# Patient Record
Sex: MALE | Race: WHITE | Employment: OTHER | URBAN - METROPOLITAN AREA
[De-identification: names, ages, dates, MRNs, and addresses within clinical notes are randomized per-mention and may not be internally consistent; named-entity substitution may affect disease eponyms.]

---

## 2019-11-11 ENCOUNTER — HOSPITAL ENCOUNTER (OUTPATIENT)
Dept: PHYSICAL THERAPY | Age: 69
Setting detail: THERAPIES SERIES
Discharge: HOME | End: 2019-11-11
Payer: MEDICARE

## 2019-11-11 PROCEDURE — 97110 THERAPEUTIC EXERCISES: CPT

## 2019-11-11 PROCEDURE — 97161 PT EVAL LOW COMPLEX 20 MIN: CPT

## 2019-11-11 ASSESSMENT — PAIN DESCRIPTION - LOCATION: LOCATION: LEG

## 2019-11-11 ASSESSMENT — PAIN DESCRIPTION - FREQUENCY: FREQUENCY: INTERMITTENT

## 2019-11-11 ASSESSMENT — PAIN DESCRIPTION - PROGRESSION: CLINICAL_PROGRESSION: GRADUALLY WORSENING

## 2019-11-11 ASSESSMENT — PAIN - FUNCTIONAL ASSESSMENT: PAIN_FUNCTIONAL_ASSESSMENT: PREVENTS OR INTERFERES WITH MANY ACTIVE NOT PASSIVE ACTIVITIES

## 2019-11-11 ASSESSMENT — PAIN SCALES - GENERAL: PAINLEVEL_OUTOF10: 2

## 2019-11-11 ASSESSMENT — PAIN DESCRIPTION - DESCRIPTORS: DESCRIPTORS: SHOOTING

## 2019-11-11 ASSESSMENT — PAIN DESCRIPTION - PAIN TYPE: TYPE: ACUTE PAIN

## 2019-11-11 ASSESSMENT — PAIN DESCRIPTION - ORIENTATION: ORIENTATION: LEFT

## 2019-11-19 ENCOUNTER — HOSPITAL ENCOUNTER (OUTPATIENT)
Dept: PHYSICAL THERAPY | Age: 69
Setting detail: THERAPIES SERIES
Discharge: HOME | End: 2019-11-19
Payer: MEDICARE

## 2019-11-19 PROCEDURE — 97140 MANUAL THERAPY 1/> REGIONS: CPT

## 2019-11-19 PROCEDURE — 97110 THERAPEUTIC EXERCISES: CPT

## 2019-11-25 ENCOUNTER — HOSPITAL ENCOUNTER (OUTPATIENT)
Dept: PHYSICAL THERAPY | Age: 69
Setting detail: THERAPIES SERIES
Discharge: HOME | End: 2019-11-25
Payer: MEDICARE

## 2019-11-25 PROCEDURE — 97110 THERAPEUTIC EXERCISES: CPT

## 2019-11-25 PROCEDURE — 97535 SELF CARE MNGMENT TRAINING: CPT

## 2019-12-03 ENCOUNTER — HOSPITAL ENCOUNTER (OUTPATIENT)
Dept: PHYSICAL THERAPY | Age: 69
Setting detail: THERAPIES SERIES
Discharge: HOME | End: 2019-12-03
Payer: MEDICARE

## 2019-12-03 PROCEDURE — 97110 THERAPEUTIC EXERCISES: CPT

## 2019-12-03 PROCEDURE — 97140 MANUAL THERAPY 1/> REGIONS: CPT

## 2019-12-10 ENCOUNTER — HOSPITAL ENCOUNTER (OUTPATIENT)
Dept: PHYSICAL THERAPY | Age: 69
Setting detail: THERAPIES SERIES
Discharge: HOME | End: 2019-12-10
Payer: MEDICARE

## 2019-12-10 PROCEDURE — 97140 MANUAL THERAPY 1/> REGIONS: CPT

## 2019-12-10 PROCEDURE — 97110 THERAPEUTIC EXERCISES: CPT

## 2019-12-23 ENCOUNTER — HOSPITAL ENCOUNTER (OUTPATIENT)
Dept: PHYSICAL THERAPY | Age: 69
Setting detail: THERAPIES SERIES
Discharge: HOME | End: 2019-12-23
Payer: MEDICARE

## 2019-12-23 PROCEDURE — 97535 SELF CARE MNGMENT TRAINING: CPT

## 2019-12-23 PROCEDURE — 97110 THERAPEUTIC EXERCISES: CPT

## 2020-01-03 ENCOUNTER — HOSPITAL ENCOUNTER (OUTPATIENT)
Dept: PHYSICAL THERAPY | Age: 70
Setting detail: THERAPIES SERIES
Discharge: HOME | End: 2020-01-03
Payer: MEDICARE

## 2020-01-03 PROCEDURE — 97140 MANUAL THERAPY 1/> REGIONS: CPT

## 2020-01-03 PROCEDURE — 97530 THERAPEUTIC ACTIVITIES: CPT

## 2020-01-03 PROCEDURE — 97110 THERAPEUTIC EXERCISES: CPT

## 2020-01-03 NOTE — FLOWSHEET NOTE
Outpatient Physical Therapy  [place]           [x] Phone: [phone]   Fax: [phone]  [facility]           [] Phone: [phone]   Fax: [phone]        Physical Therapy Daily Treatment Note  Date:  1/3/2020    Patient Name:  Arpit Jackson    :  1950  MRN: [id number]  Restrictions/Precautions:  none   Diagnosis:   Diagnosis: Left hip pain  Date of Injury/Surgery:   Treatment Diagnosis: Treatment Diagnosis: L posterior thigh pain    Insurance/Certification information: PT Insurance Information: medicare   Referring Physician:  Referring Practitioner: Stone Pino   Next Doctor Visit:    Plan of care signed (Y/N):  yes  Outcome Measure: HOOS 11  Visit# / total visits:    Pain level: 3/10       Goals:       Short term goals  Time Frame for Short term goals: 2wks  Short term goal 1: independence with HEP  Short term goal 2: improved tolerance to therapeutic exercise  Long term goals  Time Frame for Long term goals : 4wks  Long term goal 1: decrease pain 2pts on 10pt scale to meet minimal clinically important difference for pain  Long term goal 2: improve HOOS-PS 10pts to meet minimal clinically important difference for this outcome measure. Long term goal 3: improve L hamstring strength to 4+/5 to allow for decreased pain with ambulation    Summary of Evaluation: Assessment: Pt demonstrates decreased hamstring strength, tender points mid substance biceps femoris mm, and decreased hamstring flexibility. Pt would benefit from skilled PT care to decrease pain and increase tolerance to weight bearing and wellness activities including . 75mi daily walking routine. Pt demonstrates good understanding of condition and willingness to comply with HEP. Subjective:  Pt stated that his current pain was about 3/10 today. Pt stated that the worst pain was 7/10 since his last visit. Pt stated that he is not able to stand upright due to the pain.   Pt stated that he doesn't drink much water that he appropriate GT tool/tools and which stroke technique utilized were determined based on the assessment and treatment goals. The patients skin at the end of the treatment showed mild redness and improved flexiblity  Overall GT treatment time 20'  PROM knee flexion, PROM hip in all directions, and supine hamstring stretching and piriformis stretching x 10'       Modalities:  None       Communication with other providers:  pending      Assessment:  Pt tolerated treatment fairly well. Pt responded well to trial of Graston. Pt was able to stand more upright with no report of increased pain. Pt was able to get full knee extension with standing hamstring stretch on TM today. Pt had less mm spasms with prone knee flexion. End session pain: 1-2/10      Plan for Next Session: Specific instructions for Next Treatment: progress ROM and strength as able.        Time In / Time Out: 1000/1045       Timed Code/Total Treatment Minutes:     45'/ 39'     1 man ( 20') 1 TE ( 15') 1 TA ( 10')      Next Progress Note due:  Pending       Plan of Care Interventions:  [x] Therapeutic Exercise  [x] Modalities:  [x] Therapeutic Activity     [] Ultrasound  [x] Estim  [x] Gait Training      [] Cervical Traction [] Lumbar Traction  [x] Neuromuscular Re-education    [x] Cold/hotpack [] Iontophoresis   [x] Instruction in HEP      [x] Vasopneumatic   [] Dry Needling    [x] Manual Therapy               [] Aquatic Therapy              Electronically signed by: Martha Suero PTA       1/3/2020, 9:48 AM     1/3/2020,11:22 AM

## 2020-01-10 ENCOUNTER — HOSPITAL ENCOUNTER (OUTPATIENT)
Dept: PHYSICAL THERAPY | Age: 70
Setting detail: THERAPIES SERIES
Discharge: HOME | End: 2020-01-10
Payer: MEDICARE

## 2020-01-10 PROCEDURE — 97140 MANUAL THERAPY 1/> REGIONS: CPT

## 2020-01-10 PROCEDURE — 97530 THERAPEUTIC ACTIVITIES: CPT

## 2020-01-10 PROCEDURE — 97112 NEUROMUSCULAR REEDUCATION: CPT

## 2020-01-10 PROCEDURE — 97110 THERAPEUTIC EXERCISES: CPT

## 2020-01-10 NOTE — FLOWSHEET NOTE
Outpatient Physical Therapy  [place]           [x] Phone: [phone]   Fax: [phone]  [facility]           [] Phone: [phone]   Fax: [phone]        Physical Therapy Daily Treatment Note  Date:  1/10/2020    Patient Name:  Elana Ley    :  1950  MRN: [id number]  Restrictions/Precautions:  none   Diagnosis:   Diagnosis: Left hip pain  Date of Injury/Surgery:   Treatment Diagnosis: Treatment Diagnosis: L posterior thigh pain    Insurance/Certification information: PT Insurance Information: medicare   Referring Physician:  Referring Practitioner: Ana Ricci   Next Doctor Visit:    Plan of care signed (Y/N):  yes  Outcome Measure: HOOS 11  Visit# / total visits:    Pain level: 3/10       Goals:       Short term goals  Time Frame for Short term goals: 2wks  Short term goal 1: independence with HEP  Short term goal 2: improved tolerance to therapeutic exercise  Long term goals  Time Frame for Long term goals : 4wks  Long term goal 1: decrease pain 2pts on 10pt scale to meet minimal clinically important difference for pain  Long term goal 2: improve HOOS-PS 10pts to meet minimal clinically important difference for this outcome measure. Long term goal 3: improve L hamstring strength to 4+/5 to allow for decreased pain with ambulation    Summary of Evaluation: Assessment: Pt demonstrates decreased hamstring strength, tender points mid substance biceps femoris mm, and decreased hamstring flexibility. Pt would benefit from skilled PT care to decrease pain and increase tolerance to weight bearing and wellness activities including . 75mi daily walking routine. Pt demonstrates good understanding of condition and willingness to comply with HEP. Subjective:  Pt stated that his current pain was about 3/10 today. Pt stated that he saw his doctor and that she mentioned trying accupuncture ( 3 needles in his ear) for pain management.  Pt stated that she told him that it could be a long applied to the  L hamstrings because the assessment demonstrated tightness, muscle spasms, and pain. .The appropriate GT tool/tools and which stroke technique utilized were determined based on the assessment and treatment goals. The patients skin at the end of the treatment showed mild redness and improved flexiblity  Overall GT treatment time 15'  PROM knee flexion, PROM hip in all directions, and supine hamstring stretching and piriformis stretching x 10'       Modalities:  None       Communication with other providers:  pending      Assessment:  Pt tolerated treatment fairly well. Pt did well with manual treatment and stated that muscles felt more relaxed. Needed a lot cueing to get gluteal muscles to fire. Pt tends to overuse hamstrings. End session pain: 1-2/10      Plan for Next Session: Specific instructions for Next Treatment: progress ROM and strength as able.        Time In / Time Out: 1030/1125      Timed Code/Total Treatment Minutes:     55'/ 54'     1 man ( 25') 1 TE ( 10') 1 TA ( 10') 1 neuro (10')       Next Progress Note due:  Pending       Plan of Care Interventions:  [x] Therapeutic Exercise  [x] Modalities:  [x] Therapeutic Activity     [] Ultrasound  [x] Estim  [x] Gait Training      [] Cervical Traction [] Lumbar Traction  [x] Neuromuscular Re-education    [x] Cold/hotpack [] Iontophoresis   [x] Instruction in HEP      [x] Vasopneumatic   [] Dry Needling    [x] Manual Therapy               [] Aquatic Therapy              Electronically signed by: Kelly Blum PTA          1/10/2020,3:57 PM

## 2020-01-17 ENCOUNTER — HOSPITAL ENCOUNTER (OUTPATIENT)
Dept: PHYSICAL THERAPY | Age: 70
Setting detail: THERAPIES SERIES
Discharge: HOME | End: 2020-01-17
Payer: MEDICARE

## 2020-01-17 PROCEDURE — 97530 THERAPEUTIC ACTIVITIES: CPT

## 2020-01-17 PROCEDURE — 97110 THERAPEUTIC EXERCISES: CPT

## 2020-01-17 PROCEDURE — 97112 NEUROMUSCULAR REEDUCATION: CPT

## 2020-01-17 PROCEDURE — 97140 MANUAL THERAPY 1/> REGIONS: CPT

## 2020-01-17 NOTE — FLOWSHEET NOTE
Outpatient Physical Therapy  [place]           [x] Phone: [phone]   Fax: [phone]  [facility]           [] Phone: [phone]   Fax: [phone]        Physical Therapy Daily Treatment Note  Date:  2020    Patient Name:  Romayne Milling    :  1950  MRN: [id number]  Restrictions/Precautions:  none   Diagnosis:   Diagnosis: Left hip pain  Date of Injury/Surgery:   Treatment Diagnosis: Treatment Diagnosis: L posterior thigh pain    Insurance/Certification information: PT Insurance Information: medicare   Referring Physician:  Referring Practitioner: Jany Chavez   Next Doctor Visit:    Plan of care signed (Y/N):  yes  Outcome Measure: HOOS 11  Visit# / total visits:    Pain level: 3/10       Goals:       Short term goals  Time Frame for Short term goals: 2wks  Short term goal 1: independence with HEP  Short term goal 2: improved tolerance to therapeutic exercise  Long term goals  Time Frame for Long term goals : 4wks  Long term goal 1: decrease pain 2pts on 10pt scale to meet minimal clinically important difference for pain  Long term goal 2: improve HOOS-PS 10pts to meet minimal clinically important difference for this outcome measure. Long term goal 3: improve L hamstring strength to 4+/5 to allow for decreased pain with ambulation    Summary of Evaluation: Assessment: Pt demonstrates decreased hamstring strength, tender points mid substance biceps femoris mm, and decreased hamstring flexibility. Pt would benefit from skilled PT care to decrease pain and increase tolerance to weight bearing and wellness activities including . 75mi daily walking routine. Pt demonstrates good understanding of condition and willingness to comply with HEP. Subjective:  Pt stated that his current pain was about 3/10 today. Pt stated that he thinks we may be on the right track with focusing on gluteal strengthening. Any changes in Ambulatory Summary Sheet? None        Objective:     The patient received  explanation for the benefits and rational in utilizing the Graston Technique for their soft tissues limitations. Patient does not have any Red flags or absolute contraindications. Patient was instructed the use of the Graston Instruments on the skin may cause some discomfort/pain [place] of the skin, bruising or Petechiae. Patient understands these risks and has agreed to continue with the intervention. Needed a lot of cueing to perform TRX squats and needed max cues to keep knees behind his toes. Initially, patient could not actively perform prone hip extension with knee flexion on L and had to do it eccentrically, but after doing it on R, he could perform 10 actively. Exercises: (No more than 4 columns)   Exercise/Equipment date       1/17/2020   WARM UP    Nu-step  L-4 x 7'        TABLE    Prone glute set w/ frog legs 10x 2-3\" ea side  B 10x2 2-3\"    Prone ecc HS curl     Piriformis stretch with towel 2x 30\"   Hip extension w/ bent knee  Eccentric L x 10  L active 10x    R 10x2 active   Bridge with ball squeeze and feet on ball  10x2 GS        Supine hamstring stretch ---   STANDING    Hamstring stretch at treadmill    Wall slides     TRX  Squat                          PROPRIOCEPTION                        MODALITIES                 Other Therapeutic Activities/Education: Anatomy was reviewed with patient at his request.      Home Exercise Program:   practiced and pt demo ability to perform 12/23/19        Manual Treatments: GT was applied to the  L hamstrings because the assessment demonstrated tightness, muscle spasms, and pain. .The appropriate GT tool/tools and which stroke technique utilized were determined based on the assessment and treatment goals.    The patients skin at the end of the treatment showed mild redness and improved flexiblity  Overall GT treatment time 15'  PROM knee flexion, PROM hip in all directions, and supine hamstring stretching and piriformis stretching x 5' Modalities:  None       Communication with other providers:  pending      Assessment:  Pt tolerated treatment fairly well. Pt did well with manual treatment and stated that muscles felt more relaxed. Needed a lot cueing to get gluteal muscles to fire. Pt tends to overuse hamstrings. End session pain: 1-2/10      Plan for Next Session: Specific instructions for Next Treatment: progress ROM and strength as able.        Time In / Time Out: 1050/1145      Timed Code/Total Treatment Minutes:     55'/ 54'     1 man ( 20') 1 TE ( 10') 1 TA ( 10') 1 neuro (15')       Next Progress Note due:  Pending       Plan of Care Interventions:  [x] Therapeutic Exercise  [x] Modalities:  [x] Therapeutic Activity     [] Ultrasound  [x] Estim  [x] Gait Training      [] Cervical Traction [] Lumbar Traction  [x] Neuromuscular Re-education    [x] Cold/hotpack [] Iontophoresis   [x] Instruction in HEP      [x] Vasopneumatic   [] Dry Needling    [x] Manual Therapy               [] Aquatic Therapy              Electronically signed by: Victor Manuel Lawrence PTA        1/17/2020,4:44 PM

## 2020-01-24 ENCOUNTER — HOSPITAL ENCOUNTER (OUTPATIENT)
Dept: PHYSICAL THERAPY | Age: 70
Setting detail: THERAPIES SERIES
Discharge: HOME | End: 2020-01-24
Payer: MEDICARE

## 2020-01-24 PROCEDURE — 97110 THERAPEUTIC EXERCISES: CPT

## 2020-01-24 PROCEDURE — G0283 ELEC STIM OTHER THAN WOUND: HCPCS

## 2020-01-24 PROCEDURE — 97530 THERAPEUTIC ACTIVITIES: CPT

## 2020-01-24 PROCEDURE — 97112 NEUROMUSCULAR REEDUCATION: CPT

## 2020-01-24 NOTE — FLOWSHEET NOTE
Plan of Care Interventions:  [x] Therapeutic Exercise  [x] Modalities:  [x] Therapeutic Activity     [] Ultrasound  [x] Estim  [x] Gait Training      [] Cervical Traction [] Lumbar Traction  [x] Neuromuscular Re-education    [x] Cold/hotpack [] Iontophoresis   [x] Instruction in HEP      [x] Vasopneumatic   [] Dry Needling    [x] Manual Therapy               [] Aquatic Therapy              Electronically signed by: Sasha Julian PTA      1/24/2020,10:00 AM        1/24/2020,2:01 PM

## 2020-01-31 ENCOUNTER — HOSPITAL ENCOUNTER (OUTPATIENT)
Dept: PHYSICAL THERAPY | Age: 70
Setting detail: THERAPIES SERIES
Discharge: HOME | End: 2020-01-31
Payer: MEDICARE

## 2020-01-31 PROCEDURE — 97112 NEUROMUSCULAR REEDUCATION: CPT

## 2020-01-31 PROCEDURE — 97110 THERAPEUTIC EXERCISES: CPT

## 2020-01-31 NOTE — PROGRESS NOTES
and glute strength. Pt does appear to use hamstring muscles preferentially for most activities and demonstrates decreased quadriceps and gluteal tone relative to hamstrings. Approach has been to strengthen these muscles over past couple of visits through TE, but progress has been minimal. Will continue to strengthen but pt has been educated to follow up with doctor for other treatment options due to slow progress in therapy. Has been compliant with HEP throughout tx. Goal Status:  [] Achieved [x] Partially Achieved  [] Not Achieved     Changes to goals:        Short term goals  Time Frame for Short term goals: 2wks  Short term goal 1: independence with HEP met 1/31/20  Short term goal 2: improved tolerance to therapeutic exercise 1/31/20  Long term goals  Time Frame for Long term goals : 4wks  Long term goal 1: decrease pain 2pts on 10pt scale to meet minimal clinically important difference for pain  Long term goal 2: improve HOOS-PS 10pts to meet minimal clinically important difference for this outcome measure. Long term goal 3: improve L hamstring strength to 4+/5 to allow for decreased pain with ambulation         Frequency/Duration:  # Days per week: [] 1 day # Weeks: [] 1 week [] 4 weeks [] 8 weeks     [x] 2 days   [] 2 weeks [] 5 weeks [] 10 weeks     [] 3 days   [] 3 weeks [] 6 weeks [x] 12 weeks       Rehab Potential: [] Excellent [x] Good [] Fair  [] Poor         Patient Status: [] Continue per initial plan of Care     [] Patient now discharged     [x] Additional visits requested, Please re-certify for additional visits:      Requested frequency/duration:  2/week for 12weeks total throughout this plan of care. Pt has used 10/24 visits. Please confirm this is adequate for this pt. If we are requesting more visits, we fully anticipate the patient's condition is expected to improve within the treatment timeframe we are requesting.     Electronically signed by:         1/31/2020, 10:29 AM    If you have any questions or concerns, please don't hesitate to call.   Thank you for your referral.    Physician Signature:______________________ Date:______ Time: ________  By signing above, therapists plan is approved by physician

## 2020-01-31 NOTE — FLOWSHEET NOTE
Outpatient Physical Therapy  [place]           [x] Phone: [phone]   Fax: [phone]  [facility]           [] Phone: [phone]   Fax: [phone]        Physical Therapy Daily Treatment Note  Date:  2020    Patient Name:  Saúl Mitchell    :  1950  MRN: [id number]  Restrictions/Precautions:  none   Diagnosis:   Diagnosis: Left hip pain  Date of Injury/Surgery:   Treatment Diagnosis: Treatment Diagnosis: L posterior thigh pain    Insurance/Certification information: PT Insurance Information: medicare   Referring Physician:  Referring Practitioner: Serg Gonzalez   Next Doctor Visit:    Plan of care signed (Y/N):  yes  Outcome Measure: HOOS 11  Visit# / total visits:    Pain level: 310       Goals:       Short term goals  Time Frame for Short term goals: 2wks  Short term goal 1: independence with HEP  Short term goal 2: improved tolerance to therapeutic exercise  Long term goals  Time Frame for Long term goals : 4wks  Long term goal 1: decrease pain 2pts on 10pt scale to meet minimal clinically important difference for pain  Long term goal 2: improve HOOS-PS 10pts to meet minimal clinically important difference for this outcome measure. Long term goal 3: improve L hamstring strength to 4+/5 to allow for decreased pain with ambulation    Summary of Evaluation: Assessment: Pt demonstrates decreased hamstring strength, tender points mid substance biceps femoris mm, and decreased hamstring flexibility. Pt would benefit from skilled PT care to decrease pain and increase tolerance to weight bearing and wellness activities including . 75mi daily walking routine. Pt demonstrates good understanding of condition and willingness to comply with HEP. Subjective:  Pt reports difficulty sleeping, hard to turn in bed its aggrevated as I sleep. Pt reports standing endurance is limited, however endurance for exercise is getting better pt says    Any changes in Ambulatory Summary Sheet? None        Objective  R quad str 4+/5, L quad 4/5, R/L HS 4+/5         Exercises: (No more than 4 columns)   Exercise/Equipment date date       1/17/2020 1/24/2020 #9   WARM UP     Nu-step  L-4 x 7'  L-7 x 8 min         TABLE     Prone glute set w/ frog legs 10x 2-3\" ea side  B 10x2 2-3\"  --   Prone ecc HS curl   --   Piriformis stretch with towel 2x 30\" [id number]   Hip extension w/ bent knee  Eccentric L x 10  L active 10x    R 10x2 active --   Bridge with ball squeeze and feet on ball  10x2 GS --   SLR w/ ER  SLR   10x2 ea LE  10x2 ea LE    Supine hamstring stretch --- --   STANDING     Hamstring stretch at treadmill     Wall sits  10\" hold x 5   TRX  Squat      Ant step up into full extension   4\" 10x ea LE  6\" 10x2 ea LE  8\" 2x5 x ea LE   Shuttle kick back      Shuttle leg press   DL 3C 20x 2        Standing hip ext  2x5 reps   PROPRIOCEPTION                              MODALITIES                    Other Therapeutic Activities/Education: Explained to patient that he was overusing his hamstring muscles and under using his quads and gluteal muscles. Explained that power came from quads, abs, and gluteal muscles. Home Exercise Program:   practiced and pt demo ability to perform 12/23/19        Manual Treatments:    Modalities:  None       Communication with other providers:  pending      Assessment:  Pt tolerated treatment fairly well. Poor mechanics due to decreased general weakness in B quads and gluteals especially. Pt will continue to benefit from more therapy to increase ROM and strength to improve mechanics. Plan for Next Session: Specific instructions for Next Treatment: progress ROM and strength as able.        Time In / Time Out: 3833-5021     Timed Code/Total Treatment Minutes:     55/55      TE30, NE25,    Next Progress Note due:  Pending       Plan of Care Interventions:  [x] Therapeutic Exercise  [x] Modalities:  [x] Therapeutic Activity     [] Ultrasound  [x] Estim  [x] Gait Training      []

## 2020-02-07 ENCOUNTER — HOSPITAL ENCOUNTER (OUTPATIENT)
Dept: PHYSICAL THERAPY | Age: 70
Setting detail: THERAPIES SERIES
Discharge: HOME | End: 2020-02-07
Payer: MEDICARE

## 2020-02-07 PROCEDURE — 97110 THERAPEUTIC EXERCISES: CPT

## 2020-02-07 PROCEDURE — 97112 NEUROMUSCULAR REEDUCATION: CPT

## 2020-02-07 PROCEDURE — 97530 THERAPEUTIC ACTIVITIES: CPT

## 2020-02-07 NOTE — FLOWSHEET NOTE
soreness in B gluteals. Pt stated it was suggested that he should try a chiropractor. Pt stated that he has an appointment on Thursday. Pt stated that he has had some episodes of excessive bleeding. Pt stated he nicked himself on a piece of tin. Pt stated that he noticed more bleeding throughout the day. Pt stated that he is also dealing with impacted tooth woke up with a mouth full of blood. Pt stated that he stopped taking Tylenol. Any changes in Ambulatory Summary Sheet? None        Objective Pt had pretty good form with all of his exercises. Pt needed some cueing to avoid overusing hamstrings for some of his exercises. Pt was able to tolerate stretches for a longer duration today. Exercises: (No more than 4 columns)   Exercise/Equipment 2/7/2020         WARM UP    Nu-step  L-6  X 8'       TABLE    Prone ecc HS curl     Piriformis stretch with towel    Hip extension w/ bent knee     Bridge with ball squeeze and feet on ball     SLR w/ ER  SLR  10x2 ea LE w/ pillow under opposite    S/L hip abduction 10x2 ea LE    Supine hamstring stretch    STANDING    Hamstring stretch at treadmill 30\" x2  Ea LE   Sit to stand  10x w/ cues to keep knees behind toes   Wall sits 5 x 10\"    TRX  Squat     Ant step up into full extension  6\" 10x2 ea LE   Shuttle kick back     Shuttle leg press     Standing hip ext 10x2 ea LE   PROPRIOCEPTION                        MODALITIES                 Other Therapeutic Activities/Education: Explained to patient that he was overusing his hamstring muscles and under using his quads and gluteal muscles. Explained that power came from quads, abs, and gluteal muscles. Home Exercise Program:   practiced and pt demo ability to perform 2/7/2020        Manual Treatments: stick rolling to L/R hamstrings and along IT bands x 5'  Modalities:  None       Communication with other providers:  pending      Assessment:  Pt tolerated treatment fairly well. Pt is making strength and ROM gains.

## 2020-02-21 ENCOUNTER — HOSPITAL ENCOUNTER (OUTPATIENT)
Dept: PHYSICAL THERAPY | Age: 70
Setting detail: THERAPIES SERIES
Discharge: HOME | End: 2020-02-21
Payer: MEDICARE

## 2020-02-21 PROCEDURE — 97110 THERAPEUTIC EXERCISES: CPT

## 2020-02-21 NOTE — FLOWSHEET NOTE
Outpatient Physical Therapy  [place]           [x] Phone: [phone]   Fax: [phone]  [facility]           [] Phone: [phone]   Fax: [phone]        Physical Therapy Daily Treatment Note  Date:  2020    Patient Name:  Larry Napoles    :  1950  MRN: [id number]  Restrictions/Precautions:  none   Diagnosis:   Diagnosis: Left hip pain  Date of Injury/Surgery:   Treatment Diagnosis: Treatment Diagnosis: L posterior thigh pain    Insurance/Certification information: PT Insurance Information: medicare   Referring Physician:  Referring Practitioner: Carissa Dominguez   Next Doctor Visit:    Plan of care signed (Y/N):  yes  Outcome Measure: HOOS 3/20   Visit# / total visits:    Pain level: 1/10       Goals:       Short term goals  Time Frame for Short term goals: 2wks  Short term goal 1: independence with HEP  Short term goal 2: improved tolerance to therapeutic exercise  Long term goals  Time Frame for Long term goals : 4wks  Long term goal 1: decrease pain 2pts on 10pt scale to meet minimal clinically important difference for pain  Long term goal 2: improve HOOS-PS 10pts to meet minimal clinically important difference for this outcome measure. Long term goal 3: improve L hamstring strength to 4+/5 to allow for decreased pain with ambulation    Summary of Evaluation: Assessment: Pt demonstrates decreased hamstring strength, tender points mid substance biceps femoris mm, and decreased hamstring flexibility. Pt would benefit from skilled PT care to decrease pain and increase tolerance to weight bearing and wellness activities including . 75mi daily walking routine. Pt demonstrates good understanding of condition and willingness to comply with HEP. Subjective:  Pt states he has gone to the chiropractor and they did deep tissue massage as well as TENS and since that time he has felt some improvement. Also feels some improvement from the exercises given with HEP from PT.      Any some functional decrease in strength. MMT is fair for B LE, but demos significant fatigue with 2 x 10 SLR indicating quad weakness and decreased endurance. Pt does appear to have overactive hamstrings that contribute to the pain felt this therapy plan of care. Quads and glutes appear to mine underactive in functional tasks compared to hamstrings. Only modest change of these biomechanical errors have been made at this time, but pt feels he is okay to take 3 weeks off therapy to HEP and then re assess if needed with PT. Plan for Next Session: Specific instructions for Next Treatment: progress ROM and strength as able.        Time In / Time Out: 6954-2518    Timed Code/Total Treatment Minutes:  48min 3 TE    Next Progress Note due:  Pending       Plan of Care Interventions:  [x] Therapeutic Exercise  [x] Modalities:  [x] Therapeutic Activity     [] Ultrasound  [x] Estim  [x] Gait Training      [] Cervical Traction [] Lumbar Traction  [x] Neuromuscular Re-education    [x] Cold/hotpack [] Iontophoresis   [x] Instruction in HEP      [x] Vasopneumatic   [] Dry Needling    [x] Manual Therapy               [] Aquatic Therapy              Electronically signed by:         2/21/2020,9:58 AM

## 2020-03-13 NOTE — FLOWSHEET NOTE
MR LIAO CX LAST VISIT , ASK TO KEEP CHART OPEN FOR 30 DAY JUST IN CASE A PROBLEM SHOULD COME UP. THANKED EVERYONE FOR ALL THEIR HELP.

## 2020-04-07 ENCOUNTER — HOSPITAL ENCOUNTER (OUTPATIENT)
Dept: PHYSICAL THERAPY | Age: 70
Discharge: HOME | End: 2020-04-07

## 2020-04-07 NOTE — DISCHARGE SUMMARY
To: Referring Practitioner: Allegra uHffman                                                From: Debbie Rivers PT, MPT, ATC        Patient: Chelsea Duenas                                                        : 1950  Diagnosis: Diagnosis: Left hip pain     Treatment Diagnosis: Treatment Diagnosis: L posterior thigh pain   2020       Dear Rajani Crews,     Pt cancelled his discharge visit so we are unable to fully assess status or goals at this time. He was seen 13 visits, just 3 visits more than last Progress note, please refer to that for best status update. We will d/c PT at this time per his request when cancelled last 2 sessions. We can re-evaluate later prn.      Thank you,   Debbie Rivers PT, MPT, ATC     2020, 2:42 PM

## 2021-08-05 ENCOUNTER — HOSPITAL ENCOUNTER (OUTPATIENT)
Dept: PHYSICAL THERAPY | Age: 71
Setting detail: THERAPIES SERIES
Discharge: HOME | End: 2021-08-05
Payer: MEDICARE

## 2021-08-05 PROCEDURE — 97110 THERAPEUTIC EXERCISES: CPT

## 2021-08-05 PROCEDURE — 97161 PT EVAL LOW COMPLEX 20 MIN: CPT

## 2021-08-05 ASSESSMENT — PAIN DESCRIPTION - PROGRESSION: CLINICAL_PROGRESSION: GRADUALLY IMPROVING

## 2021-08-05 ASSESSMENT — PAIN DESCRIPTION - DESCRIPTORS: DESCRIPTORS: SHARP;ACHING;DULL

## 2021-08-05 ASSESSMENT — PAIN DESCRIPTION - PAIN TYPE: TYPE: CHRONIC PAIN

## 2021-08-05 ASSESSMENT — PAIN DESCRIPTION - ONSET: ONSET: GRADUAL

## 2021-08-05 ASSESSMENT — PAIN SCALES - GENERAL: PAINLEVEL_OUTOF10: 0

## 2021-08-05 ASSESSMENT — PAIN DESCRIPTION - ORIENTATION: ORIENTATION: RIGHT;ANTERIOR

## 2021-08-05 ASSESSMENT — PAIN DESCRIPTION - LOCATION: LOCATION: KNEE

## 2021-08-05 ASSESSMENT — PAIN DESCRIPTION - FREQUENCY: FREQUENCY: INTERMITTENT

## 2021-08-05 NOTE — PLAN OF CARE
Gait Training      [] Cervical Traction [] Lumbar Traction  [x] Neuromuscular Re-education    [x] Cold/hotpack [] Iontophoresis   [x] Instruction in HEP      [x] Vasopneumatic    [] Dry Needling  [x] Manual Therapy               [] Aquatic Therapy       Other:          Frequency/Duration:  # Days per week: [] 1 day # Weeks: [] 1 week [] 5 weeks     [x] 2 days   [] 2 weeks [] 6 weeks     [] 3 days   [] 3 weeks [] 7 weeks     [] 4 days   [x] 4 weeks [] 8 weeks         [] 9 weeks [] 10 weeks         [] 11 weeks [] 12 weeks    Rehab Potential/Progress: [] Excellent [x] Good [] Fair  [] Poor     Goals:    Patient goals : improve pain and mobility. Short term goals  Time Frame for Short term goals: Defer to Long Term Goals    Short term goals  Time Frame for Short term goals: Defer to [address] Term Goals: 4 weeks 9/5/21  Long Term Goal 1: Pt will demo I with HEP/symptom management. Long Term Goal 2: Pt will demo normal gait mechanics with min/no deficits to ease community mobility. Long Term Goal 3: Pt will demo 0-120 deg knee A/PROM to ease transfers. Long Term Goal 4: Pt will completed descending stairs with very min to no increase in knee pain. Long Term Goal 5: Pt will demo <8/28 improvement per KOOS to demo improved functional mobility. Long Term Goal 6: Pt will demo >5# improvement per Microfit for quads/hamstrings to ease prolonged activities. Patient Goals   Patient goals : improve pain and mobility. Electronically signed by:  Callie Benitez PT, DPT, OCS  8/5/2021, 4:14 PM    8/5/2021 4:14 PM         If you have any questions or concerns, please don't hesitate to call.   Thank you for your referral.      Physician Signature:________________________________Date:_________ TIME: _____  By signing above, therapists plan is approved by physician

## 2021-08-05 NOTE — FLOWSHEET NOTE
Outpatient Physical Therapy  [place]           [x] Phone: [phone]   Fax: [phone]  [place]           [] Phone: [phone]   Fax: [phone]        Physical Therapy Daily Treatment Note  Date:  2021    Patient Name:  Aurelia Bingham    :  1950  MRN: [id number]  Restrictions/Precautions:    Diagnosis:   R knee OA   Date of Injury/Surgery:   Treatment Diagnosis:    R knee pain, weakness, stiffness   Insurance/Certification information:   [employer]   Referring Physician: Frances PEREZ          Next Doctor Visit:    Plan of care signed (Y/N): N, sent 21   Outcome Measure: KOOS:  disability  Visit# / total visits:    per POC  Pain level: 0/10   Goals:         Short term goals  Time Frame for Short term goals: Defer to [address] Term Goals: 4 weeks 21  Long Term Goal 1: Pt will demo I with HEP/symptom management. Long Term Goal 2: Pt will demo normal gait mechanics with min/no deficits to ease community mobility. Long Term Goal 3: Pt will demo 0-120 deg knee A/PROM to ease transfers. Long Term Goal 4: Pt will completed descending stairs with very min to no increase in knee pain. Long Term Goal 5: Pt will demo < improvement per KOOS to demo improved functional mobility. Long Term Goal 6: Pt will demo >5# improvement per [employer] for quads/hamstrings to ease prolonged activities. Patient Goals   Patient goals : improve pain and mobility. Summary of Evaluation:    Pt is 66year old male with 2 month gradual onset of R knee pain. Pt now has difficulties completing closed chain activities including stairs and sit to stand and prolonged weightbearing activities with increasing pain. Pt demo deficits this date that include pain, flexibility restrictions and weakness. Issued handout for exercises to address deficits to improve function.  Pt will benefit with PT services with strength/ROM, gait training, manual and modalities to maximize

## 2021-08-05 NOTE — PROGRESS NOTES
Physical Therapy  Initial Assessment  Date: 2021  Patient Name: Mami Alcaraz  MRN: [id number]  : 1950     Treatment Diagnosis: R knee pain, weakness, stiffness    Restrictions       Subjective   General  Chart Reviewed: Yes  Patient assessed for rehabilitation services?: Yes  Referring Practitioner: Valeriano PEREZ  PT Visit Information  PT Insurance Information: [employer]/  Subjective  Subjective: 2 months ago with gradual increase in R knee pain. Completing HEP from previous management with L knee for R knee. Eventual seen of doc with no imaging with decision to send to PT. Places gel onto knees with min improvement. Return after PT is completed. Pain Screening  Patient Currently in Pain: Yes  Pain Assessment  Pain Assessment: 0-10  Pain Level: 0 (Worst: 6/10)  Patient's Stated Pain Goal: No pain  Pain Type: Chronic pain  Pain Location: Knee  Pain Orientation: Right; Anterior  Pain Descriptors: Shayne Her; Aching;Dull  Pain Frequency: Intermittent  Pain Onset: Gradual  Clinical Progression: Gradually improving  Vital Signs  Patient Currently in Pain: Yes    Vision/Hearing  Vision  Vision: Within Functional Limits  Hearing  Hearing: Within functional limits    Orientation  Orientation  Overall Orientation Status: Within Normal Limits    Social/Functional History  Social/Functional History  Type of Home: House  Home Layout: One level; Work area in basement  ADL Assistance: Independent  Homemaking Assistance: Independent  Ambulation Assistance: Independent  Transfer Assistance: Independent  Active : Yes  Mode of Transportation: Car  Occupation: Retired  Leisure & Hobbies: work in the shop    Objective     Observation/Palpation  Palpation: medial/joint line tenderness. Observation: able to stand without UE assistance. 1/2 squat with B distal thigh pain. Lack full TKE with standing on R knee and with gait with initial contact.   Edema: None observed    AROM RLE (degrees)  RLE General AROM: -4 deg to 124 deg knee ext/flex with pain with terminal knee ext  AROM LLE (degrees)  LLE General AROM: 0-140 deg knee ext/flex  Joint Mobility  ROM RLE: normal patellar mobility without crepitus. Strength RLE  Strength RLE: Exception  Comment: No pain with testing with weakness compared to non-dominant L LE  R Knee Flexion: 4+/5  R Knee Extension: 4+/5  Strength LLE  Strength LLE: WNL  Comment: 5/5 in all directions. Strength Other  Other: 5x sit to stand: 16.42 sec with 4.10 pain                   Microfit   L/R    Quads:  38/29#                     Hamstrings: /#            6MWT: 1379ft without rest break with no increase in pain. Additional Measures  Other: KOOS:  disability     Balance  Single Leg Stance R Le  Single Leg Stance L Le  Comments: No increase in pain with testing. Assessment   Conditions Requiring Skilled Therapeutic Intervention  Body structures, Functions, Activity limitations: Decreased ROM; Decreased functional mobility ; Decreased strength;Decreased endurance; Increased pain  Pt is 66year old male with 2 month gradual onset of R knee pain. Pt now has difficulties completing closed chain activities including stairs and sit to stand and prolonged weightbearing activities with increasing pain. Pt demo deficits this date that include pain, flexibility restrictions and weakness. Issued handout for exercises to address deficits to improve function. Pt will benefit with PT services with strength/ROM, gait training, manual and modalities to maximize function. Pt prior to onset of current condition had min/no pain with able to complete full ADLs and work activities. Patient received education on their current pathology and how their condition effects them with their functional activities. Patient understood discussion and questions were answered. Patient understands their activity limitations and understands rational for treatment progression.   Treatment Diagnosis: R knee pain, weakness, stiffness  Prognosis: Good  Decision Making: Low Complexity  Barriers to Learning: None  REQUIRES PT FOLLOW UP: Yes  Activity Tolerance  Activity Tolerance: Patient Tolerated treatment well         Plan   Plan  Times per week: 2  Plan weeks: 4  Specific instructions for Next Treatment: review HEP, closed chain targeting R quad, quad streching, ROM to end range ext/flex with light OP, modalities PRN  Current Treatment Recommendations: Strengthening, ROM, Neuromuscular Re-education, Manual Therapy - Soft Tissue Mobilization, Home Exercise Program, Modalities, Gait Training, Balance Training       Goals  Short term goals  Time Frame for Short term goals: Defer to Long Term Goals  Long Term Goals: 4 weeks 9/5/21  Long Term Goal 1: Pt will demo I with HEP/symptom management. Long Term Goal 2: Pt will demo normal gait mechanics with min/no deficits to ease community mobility. Long Term Goal 3: Pt will demo 0-120 deg knee A/PROM to ease transfers. Long Term Goal 4: Pt will completed descending stairs with very min to no increase in knee pain. Long Term Goal 5: Pt will demo <8/28 improvement per KOOS to demo improved functional mobility. Long Term Goal 6: Pt will demo >5# improvement per [employer] for quads/hamstrings to ease prolonged activities. Patient Goals   Patient goals : improve pain and mobility.          Roberto Petersen, PT, DPT, OCS    8/5/2021 4:13 PM

## 2021-08-09 ENCOUNTER — HOSPITAL ENCOUNTER (OUTPATIENT)
Dept: PHYSICAL THERAPY | Age: 71
Setting detail: THERAPIES SERIES
Discharge: HOME | End: 2021-08-09
Payer: MEDICARE

## 2021-08-09 PROCEDURE — 97110 THERAPEUTIC EXERCISES: CPT

## 2021-08-09 NOTE — FLOWSHEET NOTE
Outpatient Physical Therapy  [place]           [x] Phone: [phone]   Fax: [phone]  [facility]           [] Phone: [phone]   Fax: [phone]        Physical Therapy Daily Treatment Note  Date:  2021    Patient Name:  Miguelito Oseguera    :  1950  MRN: [id number]  Restrictions/Precautions:    Diagnosis:   R knee OA   Date of Injury/Surgery:   Treatment Diagnosis:    R knee pain, weakness, stiffness   Insurance/Certification information:   [employer]   Referring Physician: Dimas PEREZ          Next Doctor Visit:    Plan of care signed (Y/N): N, sent 21   Outcome Measure: KOOS:  disability  Visit# / total visits:    per POC  Pain level: 0/10   Goals:         Short term goals  Time Frame for Short term goals: Defer to [address] Term Goals: 4 weeks 21  Long Term Goal 1: Pt will demo I with HEP/symptom management. Long Term Goal 2: Pt will demo normal gait mechanics with min/no deficits to ease community mobility. Long Term Goal 3: Pt will demo 0-120 deg knee A/PROM to ease transfers. Long Term Goal 4: Pt will completed descending stairs with very min to no increase in knee pain. Long Term Goal 5: Pt will demo <8/28 improvement per KOOS to demo improved functional mobility. Long Term Goal 6: Pt will demo >5# improvement per [employer] for quads/hamstrings to ease prolonged activities. Patient Goals   Patient goals : improve pain and mobility. Summary of Evaluation:    Pt is 66year old male with 2 month gradual onset of R knee pain. Pt now has difficulties completing closed chain activities including stairs and sit to stand and prolonged weightbearing activities with increasing pain. Pt demo deficits this date that include pain, flexibility restrictions and weakness. Issued handout for exercises to address deficits to improve function.  Pt will benefit with PT services with strength/ROM, gait training, manual and modalities to maximize function. Pt prior to onset of current condition had min/no pain with able to complete full ADLs and work activities. Patient received education on their current pathology and how their condition effects them with their functional activities. Patient understood discussion and questions were answered. Patient understands their activity limitations and understands rational for treatment progression. Subjective:  Pt arrives to tx session reporting 0/10. Any changes in Ambulatory Summary Sheet? None        Objective:  See eval   COVID screening questions were asked and patient attested that there had been no contact or symptoms        Exercises: (No more than 4 columns)   Exercise/Equipment 8/5/21  #1 8/9/21 #2 Date           WARM UP        Nu step    L4  6'          TABLE      *resisted SLR RTB 10x2 RTB 10x2    * knee ext stretch in sitting  10x2  3\" 10x2  3\"    * prone quad stretch  30\"x4 30\"x4     heelslides with SB  2x10 GSB    Heel prop with weight   1'    Stool drags       Supine hip flexor stretch                STANDING      *Wall sit with R foot behind with assist to stand  10x  5\" 10x  5\"    Shuttle leg press   R LE 3c                                       PROPRIOCEPTION                                    MODALITIES                      Other Therapeutic Activities/Education: Patient received education on their current pathology and how their condition effects them with their functional activities. Patient understood discussion and questions were answered. Patient understands their activity limitations and understands rational for treatment progression. Home Exercise Program:  HO issued, reviewed and discussed with patient. Pt agreed to comply.         Manual Treatments: --       Modalities: --       Communication with other providers:  POC sent 8/5/21     Assessment:  (Response towards treatment session) (Pain Rating)       Pt is 66year old male with 2 month gradual onset of R knee pain. Pt now has difficulties completing closed chain activities including stairs and sit to stand and prolonged weightbearing activities with increasing pain. Pt demo deficits this date that include pain, flexibility restrictions and weakness. Issued handout for exercises to address deficits to improve function. Pt will benefit with PT services with strength/ROM, gait training, manual and modalities to maximize function. Pt prior to onset of current condition had min/no pain with able to complete full ADLs and work activities. Patient received education on their current pathology and how their condition effects them with their functional activities. Patient understood discussion and questions were answered. Patient understands their activity limitations and understands rational for treatment progression.       Plan for Next Session:  review HEP, closed chain targeting R quad, quad streching, ROM to end range ext/flex with light OP, modalities PRN      Time In / Time Out:   1515 / 1555       If BWC Please Indicate Time In/Out/Total Time  CPT Code Time in Time out Total Time                                                            Total for session             Timed Code/Total Treatment Minutes:  36' / 36'    3 TE      Next Progress Note due:  Eval 8/5/21      30 days       Plan of Care Interventions:  [x] Therapeutic Exercise  [x] Modalities:  [x] Therapeutic Activity     [] Ultrasound  [x] Estim  [x] Gait Training      [] Cervical Traction [] Lumbar Traction  [x] Neuromuscular Re-education    [x] Cold/hotpack [] Iontophoresis   [x] Instruction in HEP      [x] Vasopneumatic   [] Dry Needling    [x] Manual Therapy               [] Aquatic Therapy              Electronically signed by:  Tabitha Parrish PTA   8/9/21   3:15 pm

## 2021-08-13 ENCOUNTER — HOSPITAL ENCOUNTER (OUTPATIENT)
Dept: PHYSICAL THERAPY | Age: 71
Setting detail: THERAPIES SERIES
Discharge: HOME | End: 2021-08-13
Payer: MEDICARE

## 2021-08-13 PROCEDURE — 97530 THERAPEUTIC ACTIVITIES: CPT

## 2021-08-13 PROCEDURE — 97110 THERAPEUTIC EXERCISES: CPT

## 2021-08-13 NOTE — FLOWSHEET NOTE
Outpatient Physical Therapy  [place]           [x] Phone: [phone]   Fax: [phone]  [place]           [] Phone: [phone]   Fax: [phone]        Physical Therapy Daily Treatment Note  Date:  2021    Patient Name:  Lynette Webster    :  1950  MRN: [id number]  Restrictions/Precautions:    Diagnosis:   R knee OA   Date of Injury/Surgery:   Treatment Diagnosis:    R knee pain, weakness, stiffness   Insurance/Certification information:   [employer]   Referring Physician: Yessica PEREZ          Next Doctor Visit:    Plan of care signed (Y/N): N, sent 21   Outcome Measure: KOOS:  disability  Visit# / total visits:   3/8 per POC  Pain level:  0/10   Goals:         Short term goals  Time Frame for Short term goals: Defer to [address] [facility] Term Goals: 4 weeks 21  Long Term Goal 1: Pt will demo I with HEP/symptom management. Long Term Goal 2: Pt will demo normal gait mechanics with min/no deficits to ease community mobility. Mostly MET   Long Term Goal 3: Pt will demo 0-120 deg knee A/PROM to ease transfers. Long Term Goal 4: Pt will completed descending stairs with very min to no increase in knee pain. Long Term Goal 5: Pt will demo <8/28 improvement per KOOS to demo improved functional mobility. Long Term Goal 6: Pt will demo >5# improvement per Microfit for quads/hamstrings to ease prolonged activities. Patient Goals   Patient goals : improve pain and mobility. Summary of Evaluation:    Pt is 66year old male with 2 month gradual onset of R knee pain. Pt now has difficulties completing closed chain activities including stairs and sit to stand and prolonged weightbearing activities with increasing pain. Pt demo deficits this date that include pain, flexibility restrictions and weakness. Issued handout for exercises to address deficits to improve function.  Pt will benefit with PT services with strength/ROM, gait training, manual and modalities to maximize function. Pt prior to onset of current condition had min/no pain with able to complete full ADLs and work activities. Patient received education on their current pathology and how their condition effects them with their functional activities. Patient understood discussion and questions were answered. Patient understands their activity limitations and understands rational for treatment progression. Subjective:  Pt arrives to tx session reporting 0/10. Completing HEP. Soreness after last visit. Has increased walking with no increase in pain. Any changes in Ambulatory Summary Sheet? None        Objective:     COVID screening questions were asked and patient attested that there had been no contact or symptoms    Demo near full TKE with min medial knee discomfort  Good technique with closed chain activities. Full knee flexion with improvement rectus flexibility with prone quad stretch. Exercises: (No more than 4 columns)   Exercise/Equipment 8/5/21  #1 8/9/21 #2 8/13/21  #3           WARM UP        Nu step    L4  6'    R bike    5'   TABLE      *resisted SLR RTB 10x2 RTB 10x2    * knee ext stretch in sitting  10x2  3\" 10x2  3\"    * prone quad stretch  30\"x4 30\"x4 30\"x4    heelslides with SB  2x10 GSB    Heel prop with weight   1' 5# 1'    Stool drags    10x2   Supine hip flexor stretch    20\"x4      Hamstring stretch    20\"x4                                 STANDING      *Wall sit with R foot behind with assist to stand  10x  5\" 10x  5\" 5x2  5\"   Shuttle leg press   B LE 3c 10x2 R LE 2c 10x2   Lateral step ups    6in 10x2                                PROPRIOCEPTION                                    MODALITIES                      Other Therapeutic Activities/Education: --      Home Exercise Program:  HO issued, reviewed and discussed with patient. Pt agreed to comply.         Manual Treatments: --       Modalities: --       Communication with other providers:  POC sent 8/5/21 Ultrasound  [x] Estim  [x] Gait Training      [] Cervical Traction [] Lumbar Traction  [x] Neuromuscular Re-education    [x] Cold/hotpack [] Iontophoresis   [x] Instruction in HEP      [x] Vasopneumatic   [] Dry Needling    [x] Manual Therapy               [] Aquatic Therapy              Electronically signed by:  Cristal Delacruz PT, DPT, OCS      8/13/2021 1:03 PM

## 2021-08-18 ENCOUNTER — HOSPITAL ENCOUNTER (OUTPATIENT)
Dept: PHYSICAL THERAPY | Age: 71
Setting detail: THERAPIES SERIES
Discharge: HOME | End: 2021-08-18
Payer: MEDICARE

## 2021-08-18 PROCEDURE — 97530 THERAPEUTIC ACTIVITIES: CPT

## 2021-08-18 PROCEDURE — 97110 THERAPEUTIC EXERCISES: CPT

## 2021-08-18 NOTE — FLOWSHEET NOTE
Outpatient Physical Therapy  [place]           [x] Phone: [phone]   Fax: [phone]  Annel John           [] Phone: [phone]   Fax: [phone]        Physical Therapy Daily Treatment Note  Date:  2021    Patient Name:  Phylicia Pimentel    :  1950  MRN: [id number]  Restrictions/Precautions:    Diagnosis:   R knee OA   Date of Injury/Surgery:   Treatment Diagnosis:    R knee pain, weakness, stiffness   Insurance/Certification information:   [employer]   Referring Physician: Marcia PEREZ          Next Doctor Visit:    Plan of care signed (Y/N): N, sent 21   Outcome Measure: KOOS:  disability  Visit# / total visits:    per POC  Pain level:   0/10   Goals:         Short term goals  Time Frame for Short term goals: Defer to [address] [facility] [address] Term Goals: 4 weeks 21  Long Term Goal 1: Pt will demo I with HEP/symptom management. Long Term Goal 2: Pt will demo normal gait mechanics with min/no deficits to ease community mobility. Mostly MET   Long Term Goal 3: Pt will demo 0-120 deg knee A/PROM to ease transfers. Long Term Goal 4: Pt will completed descending stairs with very min to no increase in knee pain. Long Term Goal 5: Pt will demo <8/28 improvement per KOOS to demo improved functional mobility. Long Term Goal 6: Pt will demo >5# improvement per [employer] for quads/hamstrings to ease prolonged activities. Patient Goals   Patient goals : improve pain and mobility. Summary of Evaluation:    Pt is 66year old male with 2 month gradual onset of R knee pain. Pt now has difficulties completing closed chain activities including stairs and sit to stand and prolonged weightbearing activities with increasing pain. Pt demo deficits this date that include pain, flexibility restrictions and weakness. Issued handout for exercises to address deficits to improve function.  Pt will benefit with PT services with strength/ROM, gait training, manual and modalities to maximize function. Pt prior to onset of current condition had min/no pain with able to complete full ADLs and work activities. Patient received education on their current pathology and how their condition effects them with their functional activities. Patient understood discussion and questions were answered. Patient understands their activity limitations and understands rational for treatment progression. Subjective:  Pt arrives to tx session reporting 0/10. Completing HEP. Soreness after last visit for a couple of days actually in to the knee and not into the muscles. States having a picnic with walking in the grass the day after as well. Any changes in Ambulatory Summary Sheet? None        Objective:     COVID screening questions were asked and patient attested that there had been no contact or symptoms    Demo near full TKE with min medial knee discomfort  Full knee flexion with improvement rectus flexibility with prone quad stretch. -2 deg knee ext to 130 deg knee flexion post tx.        Exercises: (No more than 4 columns)   Exercise/Equipment 8/5/21  #1 8/9/21 #2 8/13/21  #3 8/18/21  #4            WARM UP         Nu step    L4  6'     R bike    5' 5'   TABLE       *resisted SLR RTB 10x2 RTB 10x2  5# 10x2   * knee ext stretch in sitting  10x2  3\" 10x2  3\"     * prone quad stretch  30\"x4 30\"x4 30\"x4 30\"x4    heelslides with SB  2x10 GSB  10x2  GSB   Heel prop with weight   1' 5# 1'  5# 1' x2   Stool drags    10x2    Supine hip flexor stretch    20\"x4 20\"x4      Hamstring stretch    20\"x4 20\"x4                                      STANDING       *Wall sit with R foot behind with assist to stand  10x  5\" 10x  5\" 5x2  5\"    Shuttle leg press   B LE 3c 10x2 R LE 2c 10x2    Lateral step ups    6in 10x2    TKE     FM 17#   10x2  3\"   FM Bwd stepping     20# 5x2                      PROPRIOCEPTION                                          MODALITIES                         Other Therapeutic Activities/Education: --      Home Exercise Program:  HO issued, reviewed and discussed with patient. Pt agreed to comply. Manual Treatments: light mobs into knee ext at end range       Modalities: --       Communication with other providers:  POC sent 8/5/21     Assessment:  (Response towards treatment session) (Pain Rating)    Pt tolerated all activities well. Min soreness with TKE stretching and light closed chain activities without adverse effects. Min soreness for 2 days after last visit but has recovered well. Improved knee flexion post tx compared to eval. Functionally improving with strength/ROM and progressing with current plan. Very compliant with home and appears tolerating increase in intensity. Will assess next visit for any delayed adverse effects. Will progress to improved closed chain strength and terminal motion as tolerated to further improve function. Reported soreness 1/10 pain post tx. Pt is 66year old male with 2 month gradual onset of R knee pain. Pt now has difficulties completing closed chain activities including stairs and sit to stand and prolonged weightbearing activities with increasing pain. Pt demo deficits this date that include pain, flexibility restrictions and weakness. Issued handout for exercises to address deficits to improve function. Pt will benefit with PT services with strength/ROM, gait training, manual and modalities to maximize function. Pt prior to onset of current condition had min/no pain with able to complete full ADLs and work activities. Patient received education on their current pathology and how their condition effects them with their functional activities. Patient understood discussion and questions were answered. Patient understands their activity limitations and understands rational for treatment progression.       Plan for Next Session:   closed chain targeting R quad, quad streching, ROM to end range ext/flex with light OP, modalities

## 2021-08-20 ENCOUNTER — HOSPITAL ENCOUNTER (OUTPATIENT)
Dept: PHYSICAL THERAPY | Age: 71
Setting detail: THERAPIES SERIES
Discharge: HOME | End: 2021-08-20
Payer: MEDICARE

## 2021-08-20 PROCEDURE — 97140 MANUAL THERAPY 1/> REGIONS: CPT

## 2021-08-20 PROCEDURE — 97110 THERAPEUTIC EXERCISES: CPT

## 2021-08-20 PROCEDURE — 97530 THERAPEUTIC ACTIVITIES: CPT

## 2021-08-20 NOTE — FLOWSHEET NOTE
Outpatient Physical Therapy  [place]           [x] Phone: [phone]   Fax: [phone]  [facility]           [] Phone: [phone]   Fax: [phone]        Physical Therapy Daily Treatment Note  Date:  2021    Patient Name:  Hazel Bonner    :  1950  MRN: [id number]  Restrictions/Precautions:    Diagnosis:   R knee OA   Date of Injury/Surgery:   Treatment Diagnosis:    R knee pain, weakness, stiffness   Insurance/Certification information:   [employer]   Referring Physician: Melisa PEREZ          Next Doctor Visit:    Plan of care signed (Y/N): N, sent 21   Outcome Measure: KOOS:  disability  Visit# / total visits:    per POC  Pain level:   0/10   Goals:         Short term goals  Time Frame for Short term goals: Defer to [address] [facility] [address] Term Goals: 4 weeks 21  Long Term Goal 1: Pt will demo I with HEP/symptom management. Long Term Goal 2: Pt will demo normal gait mechanics with min/no deficits to ease community mobility. Mostly MET   Long Term Goal 3: Pt will demo 0-120 deg knee A/PROM to ease transfers. Long Term Goal 4: Pt will completed descending stairs with very min to no increase in knee pain. Long Term Goal 5: Pt will demo <8/28 improvement per KOOS to demo improved functional mobility. Long Term Goal 6: Pt will demo >5# improvement per [employer] for quads/hamstrings to ease prolonged activities. Patient Goals   Patient goals : improve pain and mobility. Summary of Evaluation:    Pt is 66year old male with 2 month gradual onset of R knee pain. Pt now has difficulties completing closed chain activities including stairs and sit to stand and prolonged weightbearing activities with increasing pain. Pt demo deficits this date that include pain, flexibility restrictions and weakness. Issued handout for exercises to address deficits to improve function.  Pt will benefit with PT services with strength/ROM, gait training, manual and modalities to maximize function. Pt prior to onset of current condition had min/no pain with able to complete full ADLs and work activities. Patient received education on their current pathology and how their condition effects them with their functional activities. Patient understood discussion and questions were answered. Patient understands their activity limitations and understands rational for treatment progression. Subjective:  Pt stated     Any changes in Ambulatory Summary Sheet? None        Objective:     COVID screening questions were asked and patient attested that there had been no contact or symptoms      Demo near full TKE with min medial knee discomfort  Full knee flexion with improvement rectus flexibility with prone quad stretch. -2 deg knee ext to 130 deg knee flexion post tx. Exercises: (No more than 4 columns)   Exercise/Equipment 8/5/21  #1 8/9/21 #2 8/13/21  #3 8/18/21  #4 8/20/21 #5             WARM UP          Nu step    L4  6'      R bike    5' 5' 5'   TABLE        *resisted SLR RTB 10x2 RTB 10x2  5# 10x2 5# 10x2   * knee ext stretch in sitting  10x2  3\" 10x2  3\"      * prone quad stretch  30\"x4 30\"x4 30\"x4 30\"x4 30\"x4    heelslides with SB  2x10 GSB  10x2  GSB 10x2  GSB   Heel prop with weight   1' 5# 1'  5# 1' x2    Stool drags    10x2     Supine hip flexor stretch    20\"x4 20\"x4 30\"x4      Hamstring stretch    20\"x4 20\"x4 20\"x4                                           STANDING        *Wall sit with R foot behind with assist to stand  10x  5\" 10x  5\" 5x2  5\"     Shuttle leg press   B LE 3c 10x2 R LE 2c 10x2     Lateral step ups    6in 10x2     TKE     FM 17#   10x2  3\" FM 17#   10x2  3\"   FM Bwd stepping     20# 5x2                         PROPRIOCEPTION                                                MODALITIES                            Other Therapeutic Activities/Education: --      Home Exercise Program:  HO issued, reviewed and discussed with patient. Pt agreed to comply. Cervical Traction [] Lumbar Traction  [x] Neuromuscular Re-education    [x] Cold/hotpack [] Iontophoresis   [x] Instruction in HEP      [x] Vasopneumatic   [] Dry Needling    [x] Manual Therapy               [] Aquatic Therapy              Electronically signed by:  Belia Goddard PTA, CLT 8/20/2021 1:05 PM

## 2021-08-24 ENCOUNTER — HOSPITAL ENCOUNTER (OUTPATIENT)
Dept: PHYSICAL THERAPY | Age: 71
Setting detail: THERAPIES SERIES
Discharge: HOME | End: 2021-08-24
Payer: MEDICARE

## 2021-08-24 PROCEDURE — 97110 THERAPEUTIC EXERCISES: CPT

## 2021-08-24 PROCEDURE — 97140 MANUAL THERAPY 1/> REGIONS: CPT

## 2021-08-24 NOTE — FLOWSHEET NOTE
Outpatient Physical Therapy  [place]           [x] Phone: [phone]   Fax: [phone]  [facility]           [] Phone: [phone]   Fax: [phone]        Physical Therapy Daily Treatment Note  Date:  2021    Patient Name:  Angelica Fischer    :  1950  MRN: [id number]  Restrictions/Precautions:    Diagnosis:   R knee OA   Date of Injury/Surgery:   Treatment Diagnosis:    R knee pain, weakness, stiffness   Insurance/Certification information:   [facility] Medicare   Referring Physician: [employer]          Next Doctor Visit:    Plan of care signed (Y/N): N, sent 21   Outcome Measure: KOOS:  disability  Visit# / total visits:    per POC  Pain level:   0/10   Goals:         Short term goals  Time Frame for Short term goals: Defer to [address] [facility] [address] Term Goals: 4 weeks 21  Long Term Goal 1: Pt will demo I with HEP/symptom management. Long Term Goal 2: Pt will demo normal gait mechanics with min/no deficits to ease community mobility. Mostly MET   Long Term Goal 3: Pt will demo 0-120 deg knee A/PROM to ease transfers. Long Term Goal 4: Pt will completed descending stairs with very min to no increase in knee pain. Long Term Goal 5: Pt will demo <828 improvement per KOOS to demo improved functional mobility. Long Term Goal 6: Pt will demo >5# improvement per [employer] for quads/hamstrings to ease prolonged activities. Patient Goals   Patient goals : improve pain and mobility. Summary of Evaluation:    Pt is 66year old male with 2 month gradual onset of R knee pain. Pt now has difficulties completing closed chain activities including stairs and sit to stand and prolonged weightbearing activities with increasing pain. Pt demo deficits this date that include pain, flexibility restrictions and weakness. Issued handout for exercises to address deficits to improve function.  Pt will benefit with PT services with strength/ROM, gait training, manual and modalities to maximize function. Pt prior to onset of current condition had min/no pain with able to complete full ADLs and work activities. Patient received education on their current pathology and how their condition effects them with their functional activities. Patient understood discussion and questions were answered. Patient understands their activity limitations and understands rational for treatment progression. Subjective:  Pt stated he feels the PT is helping his knee get stronger and more flexible. It hurts when he is going down stair and STS. Any changes in Ambulatory Summary Sheet? None      Objective:     COVID screening questions were asked and patient attested that there had been no contact or symptoms      full TKE with min medial knee discomfort  Full knee flexion with improvement rectus flexibility with prone quad stretch.     -1-2 deg knee ext to 130 deg knee flexion post tx.        Exercises: (No more than 4 columns)   Exercise/Equipment 8/5/21  #1 8/9/21 #2 8/13/21  #3 8/18/21  #4 8/20/21 #5 8/24/21 #6              WARM UP           Nu step    L4  6'       R bike    5' 5' 5' 5'   TABLE         *resisted SLR RTB 10x2 RTB 10x2  5# 10x2 5# 10x2 5# 10x2   * knee ext stretch in sitting  10x2  3\" 10x2  3\"       * prone quad stretch  30\"x4 30\"x4 30\"x4 30\"x4 30\"x4 30\"x4    heelslides with SB  2x10 GSB  10x2  GSB 10x2  GSB 10x2  GSB   Heel prop with weight   1' 5# 1'  5# 1' x2  5# 1' x2   Stool drags    10x2      Supine hip flexor stretch    20\"x4 20\"x4 30\"x4       Hamstring stretch    20\"x4 20\"x4 20\"x4                                                 STANDING         *Wall sit with R foot behind with assist to stand  10x  5\" 10x  5\" 5x2  5\"      Shuttle leg press   B LE 3c 10x2 R LE 2c 10x2      Lateral step ups    6in 10x2      TKE     FM 17#   10x2  3\" FM 17#   10x2  3\" FM 17#   10x2  3\"   FM Bwd stepping     20# 5x2  20# 5x2                          PROPRIOCEPTION MODALITIES                               Other Therapeutic Activities/Education: --      Home Exercise Program:  HO issued, reviewed and discussed with patient. Pt agreed to comply. Manual Treatments: STM/ TPR lateral calf, prone quad stretch. Modalities: 3' Ice massage on inferomedial to R patella       Communication with other providers:  POC sent 8/5/21     Assessment:  (Response towards treatment session) (Pain Rating)  Pt demonstrated good tolerance to tx today. pain with pressure posterolateral calf and monik medial to patella. Compliance with HEP. Pt has appointment with doctor to possibly get imaging done. Pt would continue to benefit from skilled therapy interventions to address remaining impairments, improve mobility and strength and progress toward goal completion while reducing risk for re-injury or further decline. pain 0/10 moderate fatigue in the knee post tx       Pt is 66year old male with 2 month gradual onset of R knee pain. Pt now has difficulties completing closed chain activities including stairs and sit to stand and prolonged weightbearing activities with increasing pain. Pt demo deficits this date that include pain, flexibility restrictions and weakness. Issued handout for exercises to address deficits to improve function. Pt will benefit with PT services with strength/ROM, gait training, manual and modalities to maximize function. Pt prior to onset of current condition had min/no pain with able to complete full ADLs and work activities. Patient received education on their current pathology and how their condition effects them with their functional activities. Patient understood discussion and questions were answered. Patient understands their activity limitations and understands rational for treatment progression.       Plan for Next Session:   closed chain targeting R quad, quad streching, ROM to end range ext/flex with light OP, modalities PRN      Time In / Time Out:   9646-6080    Timed Code/Total Treatment Minutes:  39/39'     2 TE 23'  1 MT 15'          Next Progress Note due:  Eval 8/5/21      30 days       Plan of Care Interventions:  [x] Therapeutic Exercise  [x] Modalities:  [x] Therapeutic Activity     [] Ultrasound  [x] Estim  [x] Gait Training      [] Cervical Traction [] Lumbar Traction  [x] Neuromuscular Re-education    [x] Cold/hotpack [] Iontophoresis   [x] Instruction in HEP      [x] Vasopneumatic   [] Dry Needling    [x] Manual Therapy               [] Aquatic Therapy              Electronically signed by:  Chante Browne PTA, CLT 8/24/2021 4:19 PM

## 2021-08-27 ENCOUNTER — HOSPITAL ENCOUNTER (OUTPATIENT)
Dept: PHYSICAL THERAPY | Age: 71
Setting detail: THERAPIES SERIES
Discharge: HOME | End: 2021-08-27
Payer: MEDICARE

## 2021-08-27 PROCEDURE — 97110 THERAPEUTIC EXERCISES: CPT | Performed by: PHYSICAL THERAPY ASSISTANT

## 2021-08-27 NOTE — FLOWSHEET NOTE
Outpatient Physical Therapy  [place]           [x] Phone: [phone]   Fax: [phone]  [facility]           [] Phone: [phone]   Fax: [phone]        Physical Therapy Daily Treatment Note  Date:  2021    Patient Name:  Jennifer Prather    :  1950  MRN: [id number]  Restrictions/Precautions:    Diagnosis:   R knee OA   Date of Injury/Surgery:   Treatment Diagnosis:    R knee pain, weakness, stiffness   Insurance/Certification information:   [employer]   Referring Physician: Gustavo PEREZ          Next Doctor Visit:    Plan of care signed (Y/N): N, sent 21   Outcome Measure: KOOS:  disability  Visit# / total visits:    per POC  Pain level:   0/10   Goals:         Short term goals  Time Frame for Short term goals: Defer to [address] [facility] [address] Term Goals: 4 weeks 21  Long Term Goal 1: Pt will demo I with HEP/symptom management. Long Term Goal 2: Pt will demo normal gait mechanics with min/no deficits to ease community mobility. Mostly MET   Long Term Goal 3: Pt will demo 0-120 deg knee A/PROM to ease transfers. Long Term Goal 4: Pt will completed descending stairs with very min to no increase in knee pain. Long Term Goal 5: Pt will demo <8/28 improvement per KOOS to demo improved functional mobility. Long Term Goal 6: Pt will demo >5# improvement per [employer] for quads/hamstrings to ease prolonged activities. Patient Goals   Patient goals : improve pain and mobility. Summary of Evaluation:    Pt is 66year old male with 2 month gradual onset of R knee pain. Pt now has difficulties completing closed chain activities including stairs and sit to stand and prolonged weightbearing activities with increasing pain. Pt demo deficits this date that include pain, flexibility restrictions and weakness. Issued handout for exercises to address deficits to improve function.  Pt will benefit with PT services with strength/ROM, gait training, manual and modalities to maximize function. Pt prior to onset of current condition had min/no pain with able to complete full ADLs and work activities. Patient received education on their current pathology and how their condition effects them with their functional activities. Patient understood discussion and questions were answered. Patient understands their activity limitations and understands rational for treatment progression. Subjective:  PT stated the muscle is sore but not really pain in the knee. 0/10  In the right knee. Any changes in Ambulatory Summary Sheet? None      Objective:     COVID screening questions were asked and patient attested that there had been no contact or symptoms      full TKE with min medial knee discomfort  Full knee flexion with improvement rectus flexibility with prone quad stretch.     -1-2 deg knee ext to 130 deg knee flexion post tx. Exercises: (No more than 4 columns)   Exercise/Equipment 8/18/21  #4 8/20/21 #5 8/24/21 #6 8/27/21 #7            WARM UP         Nu step         R bike  5' 5' 5' 5' at L2   TABLE       *resisted SLR 5# 10x2 5# 10x2 5# 10x2 5# 10x2   * knee ext stretch in sitting        * prone quad stretch  30\"x4 30\"x4 30\"x4     heelslides with SB 10x2  GSB 10x2  GSB 10x2  GSB    Heel prop with weight  5# 1' x2  5# 1' x2 5# 1' x2   Stool drags     Distance of 15 ft.  X 5 trips   Supine hip flexor stretch  20\"x4 30\"x4        Hamstring stretch  20\"x4 20\"x4                                        STANDING       *Wall sit with R foot behind with assist to stand     5 x hold 10\"   Shuttle leg press     3 cords 2 x 10   Lateral step ups     6\" step, 1 x 10    TKE  FM 17#   10x2  3\" FM 17#   10x2  3\" FM 17#   10x2  3\" FM 17# 10x2  3\"   FM Bwd stepping  20# 5x2  20# 5x2 20# 5 x 1                      PROPRIOCEPTION                                          MODALITIES                         Other Therapeutic Activities/Education: --      Home Exercise Program:  HO issued, reviewed and discussed with patient. Pt agreed to comply. Manual Treatments:       Modalities:   Pt refused te ice today. Communication with other providers:  POC sent 8/5/21     Assessment:  (Response towards treatment session) (Pain Rating)  Pt demonstrated good tolerance to tx today. Pain more noted when ascending the step laterally at the medial border of the right patella. Compliance with HEP. Pt has appointment with doctor to possibly get imaging done. Pt would continue to benefit from skilled therapy interventions to address remaining impairments, improve mobility and strength and progress toward goal completion while reducing risk for re-injury or further decline. pain 0-1/10 moderate fatigue in the knee post tx       Pt is 66year old male with 2 month gradual onset of R knee pain. Pt now has difficulties completing closed chain activities including stairs and sit to stand and prolonged weightbearing activities with increasing pain. Pt demo deficits this date that include pain, flexibility restrictions and weakness. Issued handout for exercises to address deficits to improve function. Pt will benefit with PT services with strength/ROM, gait training, manual and modalities to maximize function. Pt prior to onset of current condition had min/no pain with able to complete full ADLs and work activities. Patient received education on their current pathology and how their condition effects them with their functional activities. Patient understood discussion and questions were answered. Patient understands their activity limitations and understands rational for treatment progression.       Plan for Next Session:   closed chain targeting R quad, quad streching, ROM to end range ext/flex with light OP, modalities PRN      Time In / Time Out:   1350/1430    Timed Code/Total Treatment Minutes:  40'/40' TE (3)           Next Progress Note due:  Kate 8/5/21      30 days       Plan of Care Interventions:  [x] Therapeutic Exercise  [x] Modalities:  [x] Therapeutic Activity     [] Ultrasound  [x] Estim  [x] Gait Training      [] Cervical Traction [] Lumbar Traction  [x] Neuromuscular Re-education    [x] Cold/hotpack [] Iontophoresis   [x] Instruction in HEP      [x] Vasopneumatic   [] Dry Needling    [x] Manual Therapy               [] Aquatic Therapy              Electronically signed by:

## 2021-08-31 ENCOUNTER — HOSPITAL ENCOUNTER (OUTPATIENT)
Dept: PHYSICAL THERAPY | Age: 71
Setting detail: THERAPIES SERIES
Discharge: HOME | End: 2021-08-31
Payer: MEDICARE

## 2021-08-31 PROCEDURE — 97530 THERAPEUTIC ACTIVITIES: CPT

## 2021-08-31 PROCEDURE — 97110 THERAPEUTIC EXERCISES: CPT

## 2021-08-31 NOTE — DISCHARGE SUMMARY
Outpatient Physical Therapy           [place]           [] Phone: [phone]   Fax: [phone]  [place]           [] Phone: [phone]   Fax: [phone]      To: Gustavo PEREZ            From: Sultana Wylie, PT, DPT, OCS      Patient: Jennifer Prather                   : 1950  Diagnosis:    R knee OA      Date: 2021  Treatment Diagnosis:   R knee pain, weakness, stiffness       []  Progress Note                [x]  Discharge Note    Evaluation Date:  21   Total Visits to date:  8  Cancels/No-shows to date:  0    Subjective:  Pt stated the muscle is sore in the knee but occasional wrong movements with increase in pain. Pain in the mornings but improvements with steps. Feels stronger but not 100%. Concerned of possible tear of greater OA. Plan of Care/Treatment to date:  [x] Therapeutic Exercise    [x] Modalities:  [x] Therapeutic Activity     [] Ultrasound  [] Electrical Stimulation  [] Gait Training      [] Cervical Traction   [] Lumbar Traction  [x] Neuromuscular Re-education  [x] Cold/hotpack [] Iontophoresis  [x] Instruction in HEP      Other:  [x] Manual Therapy       [x]  Vasopneumatic  [] Aquatic Therapy       []   Dry Needle Therapy                      Objective/Significant Findings At Last Visit/Comments:     No pain with palpation.   -1 deg knee ext to neutral to 133 deg knee flexion AROM  5x sit to stand: 12.10 sec without UE assistance    (16.42 sec with 4/10 pain)                     Microfit   L/R    Quads: 44/48   (38/29# at eval)                    Hamstrings:  31/33#    (28/19# at eval)    5/5 R LE strength  KOOS:   Full function     Assessment:  Pt has completed 8 visits since start of therapy on 21. Pt has increased ease completing transfers and general mobility. Pt demo improvements that include quad strength and ROM with min difficulties reaching terminal knee ext.  Remains with pain with initial weightbearing and dynamic movements of the knee

## 2021-08-31 NOTE — FLOWSHEET NOTE
Outpatient Physical Therapy  [place]           [x] Phone: [phone]   Fax: [phone]  [facility]           [] Phone: [phone]   Fax: [phone]        Physical Therapy Daily Treatment Note  Date:  2021    Patient Name:  Juventino Lee    :  1950  MRN: [id number]  Restrictions/Precautions:    Diagnosis:   R knee OA   Date of Injury/Surgery:   Treatment Diagnosis:    R knee pain, weakness, stiffness   Insurance/Certification information:   [employer]   Referring Physician: Meenu PEREZ          Next Doctor Visit:    Plan of care signed (Y/N): N, sent 21   Outcome Measure: KOOS:  disability  Visit# / total visits:    per POC  Pain level:   0 /10   Goals:         Short term goals  Time Frame for Short term goals: Defer to [address] Term Goals: 4 weeks 21  Long Term Goal 1: Pt will demo I with HEP/symptom management. Long Term Goal 2: Pt will demo normal gait mechanics with min/no deficits to ease community mobility. MET   Long Term Goal 3: Pt will demo 0-120 deg knee A/PROM to ease transfers. MET   Long Term Goal 4: Pt will completed descending stairs with very min to no increase in knee pain. MET   Long Term Goal 5: Pt will demo <8 improvement per KOOS to demo improved functional mobility. MET   Long Term Goal 6: Pt will demo >5# improvement per Microfit for quads/hamstrings to ease prolonged activities. MET   Patient Goals   Patient goals : improve pain and mobility. MET        Summary of Evaluation:    Pt is 66year old male with 2 month gradual onset of R knee pain. Pt now has difficulties completing closed chain activities including stairs and sit to stand and prolonged weightbearing activities with increasing pain. Pt demo deficits this date that include pain, flexibility restrictions and weakness. Issued handout for exercises to address deficits to improve function.  Pt will benefit with PT services with strength/ROM, gait training, manual and modalities to maximize function. Pt prior to onset of current condition had min/no pain with able to complete full ADLs and work activities. Patient received education on their current pathology and how their condition effects them with their functional activities. Patient understood discussion and questions were answered. Patient understands their activity limitations and understands rational for treatment progression. Subjective:  Pt stated the muscle is sore in the knee but occasional wrong movements with increase in pain. Pain in the mornings but improvements with steps. Feels stronger but not 100%. Concerned of possible tear of greater OA. Any changes in Ambulatory Summary Sheet? None      Objective:     COVID screening questions were asked and patient attested that there had been no contact or symptoms    No pain with palpation. Full knee flexion with improvement rectus flexibility with prone quad stretch. 1-deg knee ext to 133 deg knee flexion  5x sit to stand: 12.10 sec without UE assistance    (16.42 sec with 4/10 pain)                     Microfit   L/R    Quads: 44/48   (38/29# at eval)                    Hamstrings:  31/33#    (28/19# at eval)    5/5 R LE strength  KOOS: 8/28  Full function       Exercises: (No more than 4 columns)   Exercise/Equipment 8/18/21  #4 8/20/21 #5 8/24/21 #6 8/27/21 #7 8/31/21  #8             WARM UP          Nu step          R bike  5' 5' 5' 5' at L2 5' at L2   TABLE        *resisted SLR 5# 10x2 5# 10x2 5# 10x2 5# 10x2 GTB 10x2   * knee ext stretch in sitting      W/ hamstring stretch 10x5\"    * prone quad stretch  30\"x4 30\"x4 30\"x4  30\"x4    heelslides with SB 10x2  GSB 10x2  GSB 10x2  GSB     Heel prop with weight  5# 1' x2  5# 1' x2 5# 1' x2    Stool drags     Distance of 15 ft.  X 5 trips    Supine hip flexor stretch  20\"x4 30\"x4         Hamstring stretch  20\"x4 20\"x4                                              STANDING        *Wall sit with R foot behind with assist to stand     5 x hold 10\"    Shuttle leg press     3 cords 2 x 10    Lateral step ups     6\" step, 1 x 10     TKE  FM 17#   10x2  3\" FM 17#   10x2  3\" FM 17#   10x2  3\" FM 17# 10x2  3\"    FM Bwd stepping  20# 5x2  20# 5x2 20# 5 x 1    STS with staggered stance      10x2                PROPRIOCEPTION                                                MODALITIES                            Other Therapeutic Activities/Education: --      Home Exercise Program:  New BREONNA issued 8/31/21, reviewed and discussed with patient. Pt agreed to comply. Manual Treatments:       Modalities:   Pt refused te ice today. Communication with other providers:  POC sent 8/5/21     Assessment:  (Response towards treatment session) (Pain Rating)  Pt has completed 8 visits since start of therapy on 8/5/21. Pt has increased ease completing transfers and general mobility. Pt demo improvements that include quad strength and ROM with min difficulties reaching terminal knee ext. Remains with pain with initial weightbearing and dynamic movements of the knee with sharp pains into the knee. Appears possible mild-mod OA or meniscus involvement contributing to intermittent knee pain. Pt will benefit with PT services with progression of strength/ROM, manual and modalities to return to PLOF. Pt is 66year old male with 2 month gradual onset of R knee pain. Pt now has difficulties completing closed chain activities including stairs and sit to stand and prolonged weightbearing activities with increasing pain. Pt demo deficits this date that include pain, flexibility restrictions and weakness. Issued handout for exercises to address deficits to improve function. Pt will benefit with PT services with strength/ROM, gait training, manual and modalities to maximize function. Pt prior to onset of current condition had min/no pain with able to complete full ADLs and work activities.  Patient received education on their current pathology and how their condition effects them with their functional activities. Patient understood discussion and questions were answered. Patient understands their activity limitations and understands rational for treatment progression.       Time In / Time Out:   2728-8909    Timed Code/Total Treatment Minutes:  45/45'   25' (2) TA   1 20' TE          Next Progress Note due:  Kate 8/5/21      30 days       Plan of Care Interventions:  [x] Therapeutic Exercise  [x] Modalities:  [x] Therapeutic Activity     [] Ultrasound  [x] Estim  [x] Gait Training      [] Cervical Traction [] Lumbar Traction  [x] Neuromuscular Re-education    [x] Cold/hotpack [] Iontophoresis   [x] Instruction in HEP      [x] Vasopneumatic   [] Dry Needling    [x] Manual Therapy               [] Aquatic Therapy              Electronically signed by:   Jamar Sparks PT, DPT, OCS      8/31/2021 7:43 AM

## 2021-09-13 NOTE — FLOWSHEET NOTE
Patients Plan of Care was received and signed. Signed POC was scanned and placed in the patients chart.     Cora Cruz

## 2023-09-21 ENCOUNTER — HOSPITAL ENCOUNTER (OUTPATIENT)
Dept: PHYSICAL THERAPY | Age: 73
Setting detail: THERAPIES SERIES
Discharge: HOME | End: 2023-09-21
Payer: MEDICARE

## 2023-09-21 PROCEDURE — 97161 PT EVAL LOW COMPLEX 20 MIN: CPT

## 2023-09-21 PROCEDURE — 97110 THERAPEUTIC EXERCISES: CPT

## 2023-09-21 ASSESSMENT — PAIN DESCRIPTION - ORIENTATION: ORIENTATION: RIGHT;OUTER

## 2023-09-21 ASSESSMENT — PAIN DESCRIPTION - LOCATION: LOCATION: BACK;HIP;LEG

## 2023-09-21 ASSESSMENT — PAIN DESCRIPTION - PAIN TYPE: TYPE: ACUTE PAIN

## 2023-09-21 ASSESSMENT — PAIN DESCRIPTION - DESCRIPTORS: DESCRIPTORS: ACHING;THROBBING

## 2023-09-21 ASSESSMENT — PAIN SCALES - GENERAL: PAINLEVEL_OUTOF10: 3

## 2023-09-21 NOTE — PROGRESS NOTES
Physical Therapy: Initial Evaluation    Patient: Nayla Hernandez (29 y.o. male)   Examination Date:   Plan of Care Certification Period: 2023 to        :  1950 ;    Confirmed: Yes MRN: [id number]  CSN: [id number]   Insurance: Payor: MEDICARE / Plan: MEDICARE PART A AND B / Product Type: *No Product type* /   Insurance ID: [id number] - (Medicare) Secondary Insurance (if applicable): SHAUNNA   Referring Physician: No ref. provider found     PCP: Kristopher Chavez (Inactive) Visits to Date/Visits Approved:   /      No Show/Cancelled Appts:   /       Medical Diagnosis: Low back pain, unspecified [M54.50]    Treatment Diagnosis: Low Back/R LE pain, flexiiblity restrictions, LE weakness. PERTINENT MEDICAL HISTORY   Patient Assessed for Rehabilitation Services: Yes  Self reported health status[de-identified] Good    Medical History: Chart Reviewed: Yes History reviewed. No pertinent past medical history. Surgical History: History reviewed. No pertinent surgical history. Medications: No current outpatient medications on file. Allergies: Patient has no known allergies. SUBJECTIVE EXAMINATION     History obtained from[de-identified] Chart Review, Patient,      Family/Caregiver Present: No    Subjective History:    Subjective:  with waking with low back pain just superior to R glute and having R lateral thigh into lateral calf. Denies any change in activity prior to day of waking with increase in pain. Having more LE pain than low back. Numbness into lateral calf. Denies any previous incidents of LE and back pain. Having difficulty with prolonged sitting/standing with increase in symptoms. Challenge with walking at this time due to increase in symptoms. Waking multiple times a night due to pain and inability to comfortable. Denies LE weakness or wanting to give out. Change of positions for comfort and no specific activity to worsen. Taking ibuprofen with min improvement.  No follow up at this

## 2023-09-21 NOTE — PLAN OF CARE
[facility] PHYSICAL THERAPY  [address] [place] [address], # [place]io Zhang [address]  Dept: [phone]  Dept Fax: [phone]  Loc: [phone]    PHYSICAL THERAPY PLAN OF CARE: INITIAL EVALUATION    Patient: Fe Irwin (89 y.o. male)   Examination Date:   Plan of Care Certification Period: 2023 to        :  1950  MRN: [id number]  CSN: [id number]   Insurance: Payor: MEDICARE / Plan: MEDICARE PART A AND B / Product Type: *No Product type* /   Insurance ID: [id number] - (Medicare) Secondary Insurance (if applicable): SHAUNNA   Referring Physician: No ref. provider found     PCP: Miguel Mario (Inactive) Visits to Date/Visits Approved:   /      No Show/Cancelled Appts:   /       Medical Diagnosis: Low back pain, unspecified [M54.50]    Treatment Diagnosis: Low Back/R LE pain, flexiiblity restrictions, LE weakness. ASSESSMENT     Impression:  Pt is 74year old male with 1 week sudden onset of low back pain with R LE symptoms. Pt now has difficulties completing prolonged activities/positions, general mobility, ADLs and sleeping. Pt demo deficits this date that include flexibility restrictions, R LE weakness pain. Pt will benefit with PT services with progression of strength/ROM, manual and modalities to return to PLOF. Pt prior to onset of current condition had no pain with able to complete full ADLs and mobility activities. Patient received education on their current pathology and how their condition effects them with their functional activities. Patient understood discussion and questions were answered. Patient understands their activity limitations and understands rational for treatment progression.      Body Structures, Functions, Activity Limitations Requiring Skilled Therapeutic Intervention: Decreased functional mobility , Decreased ROM, Decreased endurance, Increased pain    Statement of Medical Necessity: Physical Therapy is both indicated

## 2023-09-21 NOTE — FLOWSHEET NOTE
Outpatient Physical Therapy  [place]           [x] Phone: [phone]   Fax: [phone]  [facility]           [] Phone: [phone]   Fax: [phone]        Physical Therapy Daily Treatment Note  Date:  2023    Patient Name:  Ivan Murillo    :  1950  MRN: [id number]  Restrictions/Precautions: No data recorded      Diagnosis:   LBP  Date of Injury/Surgery:   Treatment Diagnosis:  Low Back/R LE pain, flexiiblity restrictions, LE weakness. Insurance/Certification information:  Medicare/[employer]   Referring Physician:  No ref. provider found     PCP: Ravinder Tucker (Inactive)  Next Doctor Visit:    Plan of care signed (Y/N):  N, sent 23   Outcome Measure: Oswestry: 40% disability   Visit# / total visits:    per POC  Pain level: 2/10   Goals:     Patient goals: improve pain and tolerance with activities. Short term goals  Time Frame for Short term goals: [address] Term Goals Completed by 6 weeks 23 Goal Status         Long Term Goals: 6 weeks   Long Term Goal 1: Pt will demo I with HEP/symptom management. Long Term Goal 2: Pt will demo normal gait mechanics with no deficits to ease community mobility. Long Term Goal 3: Pt will demo min to no increase in symptoms into LE with 6MWT to ease prolonged ambulation. Long Term Goal 4: Pt will demo 5/5 R LE symptoms to ease prolonged ambulation. Long Term Goal 5: Pt will demo <20% improvement per Oswestry to demo improved functional mobility. Long Term Goal 6: Pt will demo full bridge without increase in symptoms to ease transfers. Summary of Evaluation:   Pt is 74year old male with 1 week sudden onset of low back pain with R LE symptoms. Pt now has difficulties completing prolonged activities/positions, general mobility, ADLs and sleeping. Pt demo deficits this date that include flexibility restrictions, R LE weakness pain.  Pt will benefit with PT services with

## 2023-09-29 ENCOUNTER — HOSPITAL ENCOUNTER (OUTPATIENT)
Dept: PHYSICAL THERAPY | Age: 73
Setting detail: THERAPIES SERIES
Discharge: HOME | End: 2023-09-29
Payer: MEDICARE

## 2023-09-29 PROCEDURE — 97110 THERAPEUTIC EXERCISES: CPT

## 2023-09-29 PROCEDURE — 97140 MANUAL THERAPY 1/> REGIONS: CPT

## 2023-09-29 NOTE — FLOWSHEET NOTE
with other providers:  POC sent 9/21/23     Assessment:  (Response towards treatment session) (Pain Rating) Pt discusses extensive morning exercise routine he does - has added new exercises/stretches from [facility] - and is feeling a little better already. Pt discusses piriformis stretch seems to give temporary relief. Upon assessment, pt demo's several asymmetries that all correct with listed interventions. Pt indicates less complaints of pain after session, but not abolished. Pt indicates the pain/numbness he had at his right thigh even seems to be waking up/feeling better. Discussed importance of core engagement and awareness throughout his ADL's. End pain: 0/10     Pt is 74year old male with 1 week sudden onset of low back pain with R LE symptoms. Pt now has difficulties completing prolonged activities/positions, general mobility, ADLs and sleeping. Pt demo deficits this date that include flexibility restrictions, R LE weakness pain. Pt will benefit with PT services with progression of strength/ROM, manual and modalities to return to PLOF. Pt prior to onset of current condition had no pain with able to complete full ADLs and mobility activities. Patient received education on their current pathology and how their condition effects them with their functional activities. Patient understood discussion and questions were answered. Patient understands their activity limitations and understands rational for treatment progression. Plan for Next Session: review HEP, stretching of paraspinals, lateral hip/thigh and pirformis, R LE traction with flexion preference, low back/R hip strengthening in all dir as able, modalities PRN.      Time In / Time Out:   1110/1203    Timed Code/Total Treatment Minutes:   MAN X 43' X 3;  TE X 10' X 1     Next Progress Note due:  Eval 9/21/23    30 days      Plan of Care Interventions:  [x] Therapeutic Exercise  [x] Modalities:  [x] Therapeutic Activity     [] Ultrasound  [x]

## 2023-10-02 ENCOUNTER — HOSPITAL ENCOUNTER (OUTPATIENT)
Dept: PHYSICAL THERAPY | Age: 73
Setting detail: THERAPIES SERIES
Discharge: HOME | End: 2023-10-02
Payer: MEDICARE

## 2023-10-02 PROCEDURE — 97530 THERAPEUTIC ACTIVITIES: CPT

## 2023-10-02 PROCEDURE — 97110 THERAPEUTIC EXERCISES: CPT

## 2023-10-02 NOTE — FLOWSHEET NOTE
Outpatient Physical Therapy  [place]           [x] Phone: [phone]   Fax: [phone]  [facility]           [] Phone: [phone]   Fax: [phone]        Physical Therapy Daily Treatment Note  Date:  10/2/2023    Patient Name:  Corinne Plana    :  1950  MRN: [id number]  Restrictions/Precautions: No data recorded      Diagnosis:   LBP  Date of Injury/Surgery:   Treatment Diagnosis:  Low Back/R LE pain, flexiiblity restrictions, LE weakness. Insurance/Certification information:  Medicare/[employer]   Referring Physician:  No ref. provider found     PCP: Moris Mc (Inactive)  Next Doctor Visit:    Plan of care signed (Y/N):  N, sent 23   Outcome Measure: Oswestry: 40% disability   Visit# / total visits:   3/12 per POC  Pain level: 2-3/10   Goals:     Patient goals: improve pain and tolerance with activities. Short term goals  Time Frame for Short term goals: Defer to [address] [facility] [address] Term Goals Completed by 6 weeks 23 Goal Status         Long Term Goals: 6 weeks   Long Term Goal 1: Pt will demo I with HEP/symptom management. Long Term Goal 2: Pt will demo normal gait mechanics with no deficits to ease community mobility. Long Term Goal 3: Pt will demo min to no increase in symptoms into LE with 6MWT to ease prolonged ambulation. Long Term Goal 4: Pt will demo 5/5 R LE symptoms to ease prolonged ambulation. Long Term Goal 5: Pt will demo <20% improvement per Oswestry to demo improved functional mobility. Long Term Goal 6: Pt will demo full bridge without increase in symptoms to ease transfers. Summary of Evaluation:   Pt is 74year old male with 1 week sudden onset of low back pain with R LE symptoms. Pt now has difficulties completing prolonged activities/positions, general mobility, ADLs and sleeping. Pt demo deficits this date that include flexibility restrictions, R LE weakness pain.  Pt will benefit with PT services with

## 2023-10-05 ENCOUNTER — HOSPITAL ENCOUNTER (OUTPATIENT)
Dept: PHYSICAL THERAPY | Age: 73
Setting detail: THERAPIES SERIES
Discharge: HOME | End: 2023-10-05
Payer: MEDICARE

## 2023-10-05 PROCEDURE — 97530 THERAPEUTIC ACTIVITIES: CPT

## 2023-10-05 PROCEDURE — 97110 THERAPEUTIC EXERCISES: CPT

## 2023-10-05 NOTE — FLOWSHEET NOTE
ABD                                                       PROPRIOCEPTION                                          MODALITIES                         Other Therapeutic Activities/Education:  Patient received education on their current pathology and how their condition effects them with their functional activities. Patient understood discussion and questions were answered. Patient understands their activity limitations and understands rational for treatment progression. Reviewed role and benefit of posture corrections and TA bracing with walking program and ADLs. Home Exercise Program:  *HO issued, reviewed and discussed with patient. All questions answered. Pt agreed to comply. Access Code: [email or web address]  URL: [email or web address]/  Date: 10/05/2023  Prepared by: Cristian Jones    Exercises  - Supine Posterior Pelvic Tilt  - 1 x daily - 3-5 x weekly - 3 sets - 10 reps - 5 hold  - Dead Bug  - 1 x daily - 3-5 x weekly - 3 sets - 10 reps - 5 hold  - Supine Transversus Abdominis Bracing - Hands on Stomach  - 1 x daily - 7 x weekly - 3 sets - 10 reps - 5 hold  - Seated Transversus Abdominis Bracing  - 1 x daily - 7 x weekly - 3 sets - 10 reps - 5 hold  - Bent Knee Fallouts  - 1 x daily - 3-5 x weekly - 3 sets - 10 reps - 5 hold             Manual Treatments:   Modalities:  ---    Communication with other providers:  POC sent 9/21/23     Assessment:  Pt tolerates tx session well without adverse reactions or complications, reports \" better, pain decreased\" at end of session. Motivated for HEP, demonstrated weak core. Pt will continue to benefit with PT services with progression of strength/ROM, manual and modalities to return to PLOF. End pain: 1/10       Pt is 74year old male with 1 week sudden onset of low back pain with R LE symptoms. Pt now has difficulties completing prolonged activities/positions, general mobility, ADLs and sleeping.  Pt demo deficits this date that include flexibility restrictions, R LE weakness

## 2023-10-11 ENCOUNTER — HOSPITAL ENCOUNTER (OUTPATIENT)
Dept: PHYSICAL THERAPY | Age: 73
Setting detail: THERAPIES SERIES
Discharge: HOME | End: 2023-10-11
Payer: MEDICARE

## 2023-10-11 PROCEDURE — 97140 MANUAL THERAPY 1/> REGIONS: CPT

## 2023-10-11 PROCEDURE — 97110 THERAPEUTIC EXERCISES: CPT

## 2023-10-11 NOTE — FLOWSHEET NOTE
Outpatient Physical Therapy  [place]           [x] Phone: [phone]   Fax: [phone]  [facility]           [] Phone: [phone]   Fax: [phone]        Physical Therapy Daily Treatment Note  Date:  10/11/2023    Patient Name:  Kelsi Sanchez    :  1950  MRN: [id number]  Restrictions/Precautions: No data recorded PER PATIENT HAS AORTIC ANEURYSM,BEING MONITORED, HAS APPT 10-23-23 FOR SONOGRAM US ON BLE. HAS BEEN MONITORED X 10 YEARS    Diagnosis:   LBP  Date of Injury/Surgery:   Treatment Diagnosis:  Low Back/R LE pain, flexiiblity restrictions, LE weakness. Insurance/Certification information:  Medicare/[employer]   Referring Physician:  No ref. provider found     PCP: Jovani Alcazar (Inactive)  Next Doctor Visit:    Plan of care signed (Y/N):  N, sent 23   Outcome Measure: Oswestry: 40% disability     Visit# / total visits:    per POC    Pain level:   3/10     Goals:     Patient goals: improve pain and tolerance with activities. Short term goals  Time Frame for Short term goals: Defer to [address] [facility] Term Goals Completed by 6 weeks 23 Goal Status         Long Term Goals: 6 weeks   Long Term Goal 1: Pt will demo I with HEP/symptom management. Long Term Goal 2: Pt will demo normal gait mechanics with no deficits to ease community mobility. Long Term Goal 3: Pt will demo min to no increase in symptoms into LE with 6MWT to ease prolonged ambulation. Long Term Goal 4: Pt will demo 5/5 R LE symptoms to ease prolonged ambulation. Long Term Goal 5: Pt will demo <20% improvement per Oswestry to demo improved functional mobility. Long Term Goal 6: Pt will demo full bridge without increase in symptoms to ease transfers. Summary of Evaluation:   Pt is 74year old male with 1 week sudden onset of low back pain with R LE symptoms. Pt now has difficulties completing prolonged activities/positions, general mobility, ADLs and sleeping.  Pt demo deficits

## 2023-10-13 ENCOUNTER — HOSPITAL ENCOUNTER (OUTPATIENT)
Dept: PHYSICAL THERAPY | Age: 73
Setting detail: THERAPIES SERIES
Discharge: HOME | End: 2023-10-13
Payer: MEDICARE

## 2023-10-13 PROCEDURE — 97140 MANUAL THERAPY 1/> REGIONS: CPT

## 2023-10-13 PROCEDURE — 97530 THERAPEUTIC ACTIVITIES: CPT

## 2023-10-13 PROCEDURE — 97110 THERAPEUTIC EXERCISES: CPT

## 2023-10-13 NOTE — FLOWSHEET NOTE
progression. Plan for Next Session: review HEP, stretching of paraspinals, lateral hip/thigh and pirformis, R LE traction with flexion preference, low back/R hip strengthening in all dir as able, modalities PRN.        Time In / Time Out:     0945 / 1022      Timed Code/Total Treatment Minutes:   40'/40'    1 TE   1 TA   1 Man     Next Progress Note due:  Eval 9/21/23    30 days      Plan of Care Interventions:  [x] Therapeutic Exercise  [x] Modalities:  [x] Therapeutic Activity     [] Ultrasound  [x] Estim  [x] Gait Training      [] Cervical Traction [x] Lumbar Traction  [x] Neuromuscular Re-education    [x] Cold/hotpack [] Iontophoresis   [x] Instruction in HEP      [] Vasopneumatic   [] Dry Needling    [x] Manual Therapy               [x] Aquatic Therapy            Electronically signed:  Candie Campoverde PTA  10/13/2023 9:42 AM

## 2023-10-17 ENCOUNTER — HOSPITAL ENCOUNTER (OUTPATIENT)
Dept: PHYSICAL THERAPY | Age: 73
Setting detail: THERAPIES SERIES
Discharge: HOME | End: 2023-10-17
Payer: MEDICARE

## 2023-10-17 PROCEDURE — 97140 MANUAL THERAPY 1/> REGIONS: CPT

## 2023-10-17 PROCEDURE — 97110 THERAPEUTIC EXERCISES: CPT

## 2023-10-17 PROCEDURE — 97530 THERAPEUTIC ACTIVITIES: CPT

## 2023-10-17 NOTE — FLOWSHEET NOTE
Outpatient Physical Therapy  [place]           [x] Phone: [phone]   Fax: [phone]  Renetta Slade           [] Phone: [phone]   Fax: [phone]        Physical Therapy Daily Treatment Note  Date:  10/17/2023    Patient Name:  Franklin Franklin    :  1950  MRN: [id number]  Restrictions/Precautions: No data recorded PER PATIENT HAS AORTIC ANEURYSM,BEING MONITORED, HAS APPT 10-23-23 FOR SONOGRAM US ON BLE. HAS BEEN MONITORED X 10 YEARS    Diagnosis:   LBP  Date of Injury/Surgery:   Treatment Diagnosis:  Low Back/R LE pain, flexiiblity restrictions, LE weakness. Insurance/Certification information:  Medicare/[employer]   Referring Physician:  No ref. provider found     PCP: Saman Hadley (Inactive)  Next Doctor Visit:    Plan of care signed (Y/N):  N, sent 23   Outcome Measure: Oswestry: 40% disability     Visit# / total visits:    per POC    Pain level:   3/10     Goals:     Patient goals: improve pain and tolerance with activities. Short term goals  Time Frame for Short term goals: Defer to [facility] Term Goals Completed by 6 weeks 23 Goal Status         Long Term Goals: 6 weeks   Long Term Goal 1: Pt will demo I with HEP/symptom management. Long Term Goal 2: Pt will demo normal gait mechanics with no deficits to ease community mobility. Long Term Goal 3: Pt will demo min to no increase in symptoms into LE with 6MWT to ease prolonged ambulation. Long Term Goal 4: Pt will demo 5/5 R LE symptoms to ease prolonged ambulation. Long Term Goal 5: Pt will demo <20% improvement per Oswestry to demo improved functional mobility. Long Term Goal 6: Pt will demo full bridge without increase in symptoms to ease transfers. Summary of Evaluation:   Pt is 74year old male with 1 week sudden onset of low back pain with R LE symptoms. Pt now has difficulties completing prolonged activities/positions, general mobility, ADLs and sleeping.  Pt demo deficits

## 2023-10-19 ENCOUNTER — HOSPITAL ENCOUNTER (OUTPATIENT)
Dept: PHYSICAL THERAPY | Age: 73
Setting detail: THERAPIES SERIES
Discharge: HOME | End: 2023-10-19
Payer: MEDICARE

## 2023-10-19 PROCEDURE — 97140 MANUAL THERAPY 1/> REGIONS: CPT

## 2023-10-19 PROCEDURE — 97110 THERAPEUTIC EXERCISES: CPT

## 2023-10-19 NOTE — FLOWSHEET NOTE
strength/ROM, manual and modalities to return to PLOF. Pt prior to onset of current condition had no pain with able to complete full ADLs and mobility activities. Patient received education on their current pathology and how their condition effects them with their functional activities. Patient understood discussion and questions were answered. Patient understands their activity limitations and understands rational for treatment progression. Plan for Next Session: review HEP, stretching of paraspinals, lateral hip/thigh and pirformis, R LE traction with flexion preference, low back/R hip strengthening in all dir as able, modalities PRN.        Time In / Time Out:     0945 / 1025      Timed Code/Total Treatment Minutes:   40'/40'    22' TE      18' Man     Next Progress Note due:  Eval 9/21/23    30 days      Plan of Care Interventions:  [x] Therapeutic Exercise  [x] Modalities:  [x] Therapeutic Activity     [] Ultrasound  [x] Estim  [x] Gait Training      [] Cervical Traction [x] Lumbar Traction  [x] Neuromuscular Re-education    [x] Cold/hotpack [] Iontophoresis   [x] Instruction in HEP      [] Vasopneumatic   [] Dry Needling    [x] Manual Therapy               [x] Aquatic Therapy            Electronically signed:  Fredrick Cavazos PT, DPT, OCS     10/19/2023 8:09 AM

## 2023-10-25 ENCOUNTER — HOSPITAL ENCOUNTER (OUTPATIENT)
Dept: PHYSICAL THERAPY | Age: 73
Setting detail: THERAPIES SERIES
Discharge: HOME | End: 2023-10-25
Payer: MEDICARE

## 2023-10-25 PROCEDURE — 97110 THERAPEUTIC EXERCISES: CPT

## 2023-10-25 PROCEDURE — 97530 THERAPEUTIC ACTIVITIES: CPT

## 2023-10-25 PROCEDURE — 97140 MANUAL THERAPY 1/> REGIONS: CPT

## 2023-10-25 NOTE — FLOWSHEET NOTE
Encounter Date: 5/3/2023       History     Chief Complaint   Patient presents with    Abdominal Pain     Patient is 70yo female that is having general abdominal pain that started last night. Reports mild nausea but no vomiting, diarrhea, or constipation. Normal bowel movements and urination. No other symptoms reported. Bp elevated, did not take meds today     71 y.o. female Past Medical History:  No date: Anxiety with depression  No date: Arthritis  No date: CKD (chronic kidney disease) stage 2, GFR 60-89 ml/min  No date: Diabetes mellitus  No date: History of Clarisse-en-Y gastric bypass  No date: Hypertension  No date: Obesity, unspecified  No date: DAHLIA (obstructive sleep apnea)  No date: Spondylosis     Pmh gastric bypass, ckd, dm htn, presents with general abd cramping pain constant since last night. Notes normal bm yeserday. No recent illness, no f/c, vomiting, diarrhea/dysuria. Does note mild nausea. Notes that she takes chronic pain meds for her back pain.  Did not take htn meds today.    Review of patient's allergies indicates:  No Known Allergies  Past Medical History:   Diagnosis Date    Anxiety with depression     Arthritis     CKD (chronic kidney disease) stage 2, GFR 60-89 ml/min     Diabetes mellitus     History of Clarisse-en-Y gastric bypass     Hypertension     Obesity, unspecified     DAHLIA (obstructive sleep apnea)     Spondylosis      Past Surgical History:   Procedure Laterality Date    CARPAL TUNNEL RELEASE Right 3/8/2019    Procedure: RELEASE, CARPAL TUNNEL right;  Surgeon: Pan Goodman MD;  Location: [facility];  Service: Orthopedics;  Laterality: Right;    CARPAL TUNNEL RELEASE Left 2019    Procedure: RELEASE, CARPAL TUNNEL- LEFT;  Surgeon: Pan Goodman MD;  Location: [address];  Service: Orthopedics;  Laterality: Left;    CATARACT EXTRACTION Bilateral      SECTION      CHOLECYSTECTOMY      EPIDURAL STEROID INJECTION INTO LUMBAR SPINE N/A 2018    Procedure: INJECTION,  ADLs and sleeping. Pt demo deficits this date that include flexibility restrictions, R LE weakness pain. Pt will benefit with PT services with progression of strength/ROM, manual and modalities to return to PLOF. Pt prior to onset of current condition had no pain with able to complete full ADLs and mobility activities. Patient received education on their current pathology and how their condition effects them with their functional activities. Patient understood discussion and questions were answered. Patient understands their activity limitations and understands rational for treatment progression. Subjective: Pt arrives to tx session reporting 2/10 pain in R LE into lateral hip and lateral calf, no back pain. Was active yesterday with blower with pain. One day with mod pain into entire LE. Feels 70-75% improvement. Any changes in Ambulatory Summary Sheet? None      Objective:     R piriformis pain with palpation  Slight forward flexed posture with ambulation but no other deficits. WFL lumbar ROM without increase in symptoms   Core: Good (+) with no increase in pain. 5/5 LE strength with no increase in pain. 6MWT: 1176ft without increase in radiating pain.      Oswestry: 18%    Exercises: (No more than 4 columns)   Exercise/Equipment 10/13/23 #6 10/17/23 #7 10/19/23  #8 10/25/23 #9            WARM UP        Nu step   Seat 10/arms 9 L5 5' L5 5' Lv5  5'  Lv5   5'           TABLE       *DKTC 20\"X3 20\"X3 20\"x3    *sitting lumbar flexion stretch     Discussed    *sitting piriformis stretch  30\"x3 30\"x3 Spine 20\"x3    *SL clamshells        *LTR 10x2  5\" 10x2  5\" 10x2  5\" 10x  3\"   Bridge with SB 10x2  RSB 10x2  RSB 10x2  RSB    Supine hip flexor stretch  30\"x3 30\"x3 30\"x3    PPT       Dead bug  Opp UE/LE with TA holds x 10 ea     Bent leg fall outs       Manual therapy       Heelslides    RSB 10x2  5\"                      STANDING       Hip ABD STEROID, SPINE, LUMBAR, EPIDURAL;  Surgeon: Shaq Silverio MD;  Location: [facility];  Service: Pain Management;  Laterality: N/A;  LUMBAR L4-L5 INTERLAMINAR ELISE'  [id number]  W/ SEDATION     HYSTERECTOMY      INJECTION OF ANESTHETIC AGENT AROUND NERVE Bilateral 9/12/2019    Procedure: BLOCK, NERVE, L3-L4-L5 MEDIAL BRANCH;  Surgeon: Shaq Silverio MD;  Location: [facility];  Service: Pain Management;  Laterality: Bilateral;    INJECTION OF ANESTHETIC AGENT AROUND NERVE Bilateral 10/17/2019    Procedure: BLOCK, NERVE;  Surgeon: Shaq Silverio MD;  Location: [facility];  Service: Pain Management;  Laterality: Bilateral;  B/L MBB L3-L4-L5  REPEAT  CONSENT NEEDED    INJECTION OF FACET JOINT Bilateral 5/28/2018    Procedure: INJECTION-FACET;  Surgeon: Shaq Silverio MD;  Location: [facility];  Service: Pain Management;  Laterality: Bilateral;  LUMBAR BILATERAL L4-L5 AND L5-S1 FACET STEROID INJECTION  [id number]    W/ SEDATION     RADIOFREQUENCY ABLATION Right 11/21/2019    Procedure: RADIOFREQUENCY ABLATION RIGHT L3, L4, L5;  Surgeon: hSaq Silverio MD;  Location: [facility];  Service: Pain Management;  Laterality: Right;  Right RFA L3-L4-L5  1 of 2  Consent Needed    RADIOFREQUENCY ABLATION Left 12/5/2019    Procedure: RADIOFREQUENCY ABLATION LEFT L3-5;  Surgeon: Shaq Silverio MD;  Location: [facility];  Service: Pain Management;  Laterality: Left;  Left RFA L3-L4-L5  2 of 2  Consent Needed    RADIOFREQUENCY ABLATION Left 8/17/2020    Procedure: RADIOFREQUENCY ABLATION LEFT L3,4,5 1 of 2;  Surgeon: Shaq Silverio MD;  Location: [facility];  Service: Pain Management;  Laterality: Left;  Left RFA L3,4,5  1 of 2    RADIOFREQUENCY ABLATION Right 8/31/2020    Procedure: RADIOFREQUENCY ABLATION RIGHT L3,4,5 2 of 2;  Surgeon: Shaq Silverio MD;  Location: [facility];  Service: Pain Management;  Laterality: Right;  RADIOFREQUENCY ABLATION RIGHT L3,4,5  2 of 2    RADIOFREQUENCY ABLATION Left 9/9/2021    Procedure:  RADIOFREQUENCY ABLATION, L3-L4 AND L5 MEDIAL BRANCH 1 OF 2;  Surgeon: Shaq Silverio MD;  Location: [facility];  Service: Pain Management;  Laterality: Left;    RADIOFREQUENCY ABLATION Right 9/20/2021    Procedure: RADIOFREQUENCY ABLATION, L3-L4 AND L5 MEDIAL BRANCH 2 OF 2;  Surgeon: Shaq Silverio MD;  Location: [facility];  Service: Pain Management;  Laterality: Right;    RADIOFREQUENCY ABLATION Right 7/7/2022    Procedure: RADIOFREQUENCY ABLATION, RIGHT L3-L4-L5 ONE OF TWO;  Surgeon: Shaq Silverio MD;  Location: [facility];  Service: Pain Management;  Laterality: Right;    RADIOFREQUENCY ABLATION Left 7/21/2022    Procedure: RADIOFREQUENCY ABLATION, LEFT L3-L4-L5 TWO OF TWO *BRING SCS REMOTE*;  Surgeon: Shaq Silverio MD;  Location: [facility];  Service: Pain Management;  Laterality: Left;    RADIOFREQUENCY ABLATION Left 3/16/2023    Procedure: RADIOFREQUENCY ABLATION LEFT L3,L4,L5 *BRING SCS REMOTE*;  Surgeon: Shaq Silverio MD;  Location: [facility];  Service: Pain Management;  Laterality: Left;    RADIOFREQUENCY ABLATION Right 3/30/2023    Procedure: RADIOFREQUENCY ABLATION RIGHT L3,L4,L5 *BRING SCS REMOTE*;  Surgeon: Shaq Silverio MD;  Location: [facility];  Service: Pain Management;  Laterality: Right;    TRIAL OF SPINAL CORD NERVE STIMULATOR N/A 9/24/2018    Procedure: TRIAL, NEUROSTIMULATOR, SPINAL CORD, SPINAL CORD STIMULATOR TRIAL-INTERNAL WIRES TO EXTERNAL BATTERY;  Surgeon: Shaq Silverio MD;  Location: [facility];  Service: Pain Management;  Laterality: N/A;  [facility] NOTIFIED     Family History   Problem Relation Age of Onset    Cataracts Mother     Glaucoma Mother     No Known Problems Father     No Known Problems Sister     No Known Problems Brother     No Known Problems Maternal Aunt     No Known Problems Maternal Uncle     No Known Problems Paternal Aunt     No Known Problems Paternal Uncle     No Known Problems Maternal Grandmother     No Known Problems Maternal Grandfather     No  Known Problems Paternal Grandmother     No Known Problems Paternal Grandfather      Social History     Tobacco Use    Smoking status: Never    Smokeless tobacco: Never   Substance Use Topics    Alcohol use: Yes     Comment: occ    Drug use: No     Review of Systems   Constitutional:  Negative for fever.   HENT:  Negative for sore throat.    Respiratory:  Negative for shortness of breath.    Cardiovascular:  Negative for chest pain.   Gastrointestinal:  Positive for abdominal pain and nausea.   Genitourinary:  Negative for dysuria.   Musculoskeletal:  Negative for back pain.   Skin:  Negative for rash.   Neurological:  Negative for weakness.   Hematological:  Does not bruise/bleed easily.   All other systems reviewed and are negative.    Physical Exam     Initial Vitals [05/03/23 1706]   BP Pulse Resp Temp SpO2   (!) 190/87 70 18 97.6 °F (36.4 °C) 97 %      MAP       --         Physical Exam    Nursing note and vitals reviewed.  Constitutional: She appears well-developed and well-nourished.   HENT:   Head: Normocephalic and atraumatic.   Eyes: Conjunctivae and EOM are normal. Pupils are equal, round, and reactive to light.   Neck:   Normal range of motion.  Cardiovascular:  Normal rate and regular rhythm.           Pulmonary/Chest: Breath sounds normal. No respiratory distress.   Abdominal: She exhibits distension. There is no abdominal tenderness. There is no rebound.   Musculoskeletal:         General: Normal range of motion.      Cervical back: Normal range of motion.     Neurological: She is alert. No cranial nerve deficit. GCS score is 15. GCS eye subscore is 4. GCS verbal subscore is 5. GCS motor subscore is 6.   Skin: Skin is warm and dry.   Psychiatric: She has a normal mood and affect. Thought content normal.   Abdomen is distended    ED Course   Procedures    MEDICAL DECISION MAKING    After review of the patient's physical exam, ED testing, and history/symptoms, relevant labs, imaging, available outside  records  a wide differential was considered including but not limited to: infectious, traumatic, vascular, toxicological , metabolic, malignant, ischemic, embolic, psychological, genetic, iatrogenic, idiopathic, medication reaction, substance dependence/intoxication/withdrawal, electrolyte or blood dyscrasia, and other etiologies.        labs/imaging/interventions include:       Medications   sodium chloride 0.9% bolus 1,000 mL 1,000 mL (0 mLs Intravenous Stopped 5/3/23 2011)   ondansetron injection 8 mg (8 mg Intravenous Given 5/3/23 1801)   HYDROmorphone injection 1 mg (1 mg Intravenous Given 5/3/23 2000)   LIDOcaine HCl 2% urojet (10 mLs Mucous Membrane Given 5/3/23 2027)     Labs Reviewed   CBC W/ AUTO DIFFERENTIAL - Abnormal; Notable for the following components:       Result Value    MCV 80 (*)     MCH 25.9 (*)     RDW 16.5 (*)     All other components within normal limits   COMPREHENSIVE METABOLIC PANEL - Abnormal; Notable for the following components:    Chloride 112 (*)     CO2 22 (*)     Glucose 147 (*)     Calcium 8.5 (*)     Albumin 3.4 (*)     Anion Gap 7 (*)     eGFR 59 (*)     All other components within normal limits   HEPATIC FUNCTION PANEL - Abnormal; Notable for the following components:    Albumin 3.4 (*)     All other components within normal limits   ISTAT PROCEDURE - Abnormal; Notable for the following components:    POC Glucose 148 (*)     POC TCO2 (MEASURED) 22 (*)     All other components within normal limits   LIPASE   LACTIC ACID, PLASMA   TROPONIN I   TSH   TSH   ISTAT CHEM8      CT Abdomen Pelvis  Without Contrast   Final Result      1. Postsurgical changes of gastric bypass.  Focal dilated small bowel loops with small bowel feces sign measuring 4.5 cm in caliber seen at the level of suture lines in the right mid abdomen.  Uncertain if this may represent a chronic finding versus potential developing small bowel obstruction.  Small bowel loops do not appear dilated proximal or distal to  this region.   2. No additional acute intra-abdominal abnormalities identified.   3. Nonobstructing right renal stone.   4. Postsurgical changes of cholecystectomy and hysterectomy.   5. Additional findings as detailed above.         Electronically signed by: Arlen Fry MD   Date:    05/03/2023   Time:    18:56      X-Ray Abdomen AP 1 View (KUB)    (Results Pending)         Labs Reviewed   CBC W/ AUTO DIFFERENTIAL - Abnormal; Notable for the following components:       Result Value    MCV 80 (*)     MCH 25.9 (*)     RDW 16.5 (*)     All other components within normal limits   COMPREHENSIVE METABOLIC PANEL - Abnormal; Notable for the following components:    Chloride 112 (*)     CO2 22 (*)     Glucose 147 (*)     Calcium 8.5 (*)     Albumin 3.4 (*)     Anion Gap 7 (*)     eGFR 59 (*)     All other components within normal limits   HEPATIC FUNCTION PANEL - Abnormal; Notable for the following components:    Albumin 3.4 (*)     All other components within normal limits   ISTAT PROCEDURE - Abnormal; Notable for the following components:    POC Glucose 148 (*)     POC TCO2 (MEASURED) 22 (*)     All other components within normal limits   LIPASE   LACTIC ACID, PLASMA   TROPONIN I   TSH   TSH   ISTAT CHEM8     EKG Readings: (Independently Interpreted)   Hr 72, sinus, nl axis/intervals, no jeff/twi, non acute, no stemi. Non specific t wave changes    Hr 101 sinus tach nl axis/intervals, no jeff, non specific st changes non acute.     Imaging Results              X-Ray Abdomen AP 1 View (KUB) (In process)                      CT Abdomen Pelvis  Without Contrast (Final result)  Result time 05/03/23 18:56:41      Final result by Arlen Fry MD (05/03/23 18:56:41)                   Impression:      1. Postsurgical changes of gastric bypass.  Focal dilated small bowel loops with small bowel feces sign measuring 4.5 cm in caliber seen at the level of suture lines in the right mid abdomen.  Uncertain if this may represent a  chronic finding versus potential developing small bowel obstruction.  Small bowel loops do not appear dilated proximal or distal to this region.  2. No additional acute intra-abdominal abnormalities identified.  3. Nonobstructing right renal stone.  4. Postsurgical changes of cholecystectomy and hysterectomy.  5. Additional findings as detailed above.      Electronically signed by: Arlen Fry MD  Date:    05/03/2023  Time:    18:56               Narrative:    EXAMINATION:  CT ABDOMEN PELVIS WITHOUT CONTRAST    CLINICAL HISTORY:  Abdominal pain, acute, nonlocalized;    TECHNIQUE:  Low dose axial images, sagittal and coronal reformations were obtained from the lung bases to the pubic symphysis.  Oral contrast was not administered.    COMPARISON:  None    FINDINGS:  The visualized portion of the heart is unremarkable.  Minimal bibasilar atelectasis or scarring is seen.  No focal consolidation or pleural effusion.    No significant hepatic abnormality seen on this noncontrast exam.  There is no intra-or extrahepatic biliary ductal dilatation.  Gallbladder has been removed.  The stomach, pancreas, spleen, and adrenal glands are unremarkable.    Kidneys show no evidence of hydronephrosis.  There is a single large 11 mm stone seen within the right kidney.  No abnormalities are seen along the ureteral courses.  Urinary bladder is nondistended.  Uterus has been removed.    Cecum and appendix terminate within the left lower quadrant.  Appendix is visualized and is otherwise unremarkable.  Postsurgical changes of gastric bypass are seen.  Focal dilated small bowel loops measuring upwards of 4.5 cm in caliber with small bowel feces sign is seen at the level of anastomosis and suture lines within the right mid abdomen.  No evidence of abnormal small bowel dilatation seen proximal or distal to this region.  No acute colonic abnormalities are seen.  No free air or free fluid.    Aorta tapers normally with mild  atherosclerosis.    No acute osseous abnormality identified.  Degenerative changes are seen within the spine lower lumbar facet arthropathy.  Spinal stimulator device is seen within the subcutaneous soft tissues of the left lower back region which electrodes seen which extend superiorly into the lower visualized thoracic spine.                                       Medications   sodium chloride 0.9% bolus 1,000 mL 1,000 mL (0 mLs Intravenous Stopped 5/3/23 2011)   ondansetron injection 8 mg (8 mg Intravenous Given 5/3/23 1801)   HYDROmorphone injection 1 mg (1 mg Intravenous Given 5/3/23 2000)   LIDOcaine HCl 2% urojet (10 mLs Mucous Membrane Given 5/3/23 2027)      KUB interpreted by me, NG tube in acceptable position                 Pt here for vague abd pain, constant since last night. Reassessed after CT results, continues to c/o 10/10 vague abd pain. Last bm yesterday morning, and last passed gas this morning.     I have independently evaluated and interpreted all available labs and imaging to the extent of the scope of my practice.  After review of the patient's physical exam, ED testing, and history/symptoms, the patient requires additional care in the hospital overnight. The hospital medicine service  will accept the patient and relevant labs, imaging, or procedures were discussed. Pending labs/imaging/interventions. The diagnosis, treatment and plan were discussed with the patient. All questions or concerns have been addressed.      Note was created using voice recognition software. Note may have occasional typographical or grammatical errors , garbled syntax, and other bizarre constructions that may not have been identified and edited despite good levi initial review prior to signing.        Clinical Impression:   Final diagnoses:  [R10.9] Abdominal pain  [Z01.89] Encounter for imaging study to confirm nasogastric (NG) tube placement  [K56.609] Intestinal obstruction, unspecified cause, unspecified  whether partial or complete (Primary)        ED Disposition Condition    Observation Stable                Eleno Pugh MD  05/03/23 2120       Eleno Pugh MD  05/03/23 2123

## 2023-11-08 ENCOUNTER — HOSPITAL ENCOUNTER (OUTPATIENT)
Dept: PHYSICAL THERAPY | Age: 73
Setting detail: THERAPIES SERIES
Discharge: HOME | End: 2023-11-08
Payer: MEDICARE

## 2023-11-08 PROCEDURE — 97140 MANUAL THERAPY 1/> REGIONS: CPT

## 2023-11-08 PROCEDURE — 97110 THERAPEUTIC EXERCISES: CPT

## 2023-11-08 NOTE — FLOWSHEET NOTE
sleeping. Pt demo deficits this date that include flexibility restrictions, R LE weakness pain. Pt will benefit with PT services with progression of strength/ROM, manual and modalities to return to PLOF. Pt prior to onset of current condition had no pain with able to complete full ADLs and mobility activities. Patient received education on their current pathology and how their condition effects them with their functional activities. Patient understood discussion and questions were answered. Patient understands their activity limitations and understands rational for treatment progression. Subjective: Pt arrives to tx session reporting 1/10 pain in R LE into lateral hip and lateral calf, \" I know its there. \"  Completing HEP and improved reduced frequency and intensity. Ultrasound for AAA first week in December. Any changes in Ambulatory Summary Sheet? None      Objective:     Min tenderness with R piriformis   Improvement in pain with R LE traction. Good technique with stretches. Greater stretch with SKTC than DKTC      Exercises: (No more than 4 columns)   Exercise/Equipment 10/19/23  #8 10/25/23 #9 11/8/23  #10            WARM UP       Nu step   Seat 10/arms 9 Lv5  5'  Lv5   5'  Lv5  5'          TABLE      *DKTC 20\"x3  SKTC  20\"x3   *sitting lumbar flexion stretch   Discussed  Discussed    *sitting piriformis stretch  Spine 20\"x3  Supine 20\"x3   *SL clamshells       *LTR 10x2  5\" 10x  3\" 10x2  5\"    Bridge with SB 10x2  RSB     Supine hip flexor stretch  30\"x3  30\"x3   PPT      Dead bug      Bent leg fall outs      Manual therapy      Heelslides  RSB 10x2  5\"                     STANDING      Hip ABD                                                PROPRIOCEPTION                                    MODALITIES                      Other Therapeutic Activities/Education:  --Reviewed role and benefit of posture corrections and TA bracing with, home program, walking program and ADLs.     Home Exercise

## 2023-11-21 ENCOUNTER — HOSPITAL ENCOUNTER (OUTPATIENT)
Dept: PHYSICAL THERAPY | Age: 73
Setting detail: THERAPIES SERIES
Discharge: HOME | End: 2023-11-21
Payer: MEDICARE

## 2023-11-21 PROCEDURE — 97110 THERAPEUTIC EXERCISES: CPT

## 2023-11-21 PROCEDURE — 97140 MANUAL THERAPY 1/> REGIONS: CPT

## 2023-11-21 NOTE — FLOWSHEET NOTE
MODALITIES                         Other Therapeutic Activities/Education:  --Reviewed role and benefit of posture corrections and TA bracing with, home program, walking program and ADLs. Home Exercise Program:  *HO issued, reviewed and discussed with patient. All questions answered. Reviewed HEP 10/11/23     Pt agreed to comply. Access Code: [email or web address]  URL: [email or web address]  Date: 10/05/2023  Prepared by: Mikaela Washburn    Exercises  - Supine Posterior Pelvic Tilt  - 1 x daily - 3-5 x weekly - 3 sets - 10 reps - 5 hold  - Dead Bug  - 1 x daily - 3-5 x weekly - 3 sets - 10 reps - 5 hold  - Supine Transversus Abdominis Bracing - Hands on Stomach  - 1 x daily - 7 x weekly - 3 sets - 10 reps - 5 hold  - Seated Transversus Abdominis Bracing  - 1 x daily - 7 x weekly - 3 sets - 10 reps - 5 hold  - Bent Knee Fallouts  - 1 x daily - 3-5 x weekly - 3 sets - 10 reps - 5 hold             Manual Treatments: MFR/STM to palpated hypertonicity into piriformis/IT region with use of hypervolt, R LE traction,   x15'       Modalities:  ---    Communication with other providers:  POC sent 9/21/23         PN sent 10/25/23       Assessment:   Amarilis Davis has completed 11 visits since start of care on 9/21/23. Demo improved tolerance to complete transfers, gait and ADLs since start of therapy. Demo good improvement in flexibility restrictions, LE/core strength, pain and gait tolerance. Demo good technique with stretches and good compliance with home program with improvement in radiating numbness and pain distally. Will place on hold for 30 days to return if indicated, will discharge after 30 days if no return is indicated. Pt agreed to this plan. End pain: 0/10       Pt is 74year old male with 1 week sudden onset of low back pain with R LE symptoms. Pt now has difficulties completing prolonged activities/positions, general mobility, ADLs and sleeping.  Pt demo deficits this date that include flexibility

## 2025-07-14 NOTE — PROGRESS NOTES
Outpatient Physical Therapy           [place]           [] Phone: [phone]   Fax: [phone]  [facility]           [] Phone: [phone]   Fax: [phone]      To: Polina Callahan   From: Dave Zhu, PT, DPT, OCS    Patient: Fe Irwin                    : 1950  Diagnosis:  Low back pain, unspecified [M54.50]        Treatment Diagnosis:   Low Back/R LE pain, flexiiblity restrictions, LE weakness    Date: 10/25/2023  [x]  Progress Note                []  Discharge Note    Evaluation Date:  23    Total Visits to date:   9 Cancels/No-shows to date:  0    Subjective:  Pt arrives to tx session reporting 2/10 pain in R LE into lateral hip and lateral calf, no back pain. Was active yesterday with blower with pain. One day with mod pain into entire LE. Feels 70-75% improvement. Plan of Care/Treatment to date:  [x] Therapeutic Exercise    [x] Modalities:  [x] Therapeutic Activity     [] Ultrasound  [x] Electrical Stimulation  [x] Gait Training      [] Cervical Traction   [] Lumbar Traction  [x] Neuromuscular Re-education  [] Cold/hotpack [] Iontophoresis  [x] Instruction in HEP      Other:  [x] Manual Therapy       []  Vasopneumatic  [] Aquatic Therapy       []   Dry Needle Therapy                      Objective/Significant Findings At Last Visit/Comments:    R piriformis pain with palpation  Slight forward flexed posture with ambulation but no other deficits. WFL lumbar ROM without increase in symptoms   Core: Good (+) with no increase in pain. 5/5 LE strength with no increase in pain. 6MWT: 1176ft without increase in radiating pain. Oswestry: 18%    Assessment:   Pt has completed 9 visits since start of care on 23. Demo improved tolerance to complete transfers, gait and ADLs since start of therapy. Demo good improvement in flexibility restrictions, LE/core strength, pain and gait tolerance.   Demo good technique with stretches and good compliance with home program with no